# Patient Record
Sex: FEMALE | HISPANIC OR LATINO
[De-identification: names, ages, dates, MRNs, and addresses within clinical notes are randomized per-mention and may not be internally consistent; named-entity substitution may affect disease eponyms.]

---

## 2022-08-01 ENCOUNTER — TRANSCRIBE ORDERS (OUTPATIENT)
Dept: OTHER | Age: 42
End: 2022-08-01

## 2022-08-01 DIAGNOSIS — R74.8 ALKALINE PHOSPHATASE ELEVATION: Primary | ICD-10-CM

## 2022-08-18 NOTE — TELEPHONE ENCOUNTER
RECORDS RECEIVED FROM: Care Everywhere   Appt Date: 08.23.2022   NOTES STATUS DETAILS   OFFICE NOTE from referring provider     OFFICE NOTES from other specialists Care Everywhere 07.20.2022 Thea Abraham MD   [facility]    DISCHARGE SUMMARY from hospital     MEDICATION LIST Care Everywhere    LIVER BIOSPY (IF APPLICABLE)      PATHOLOGY REPORTS      IMAGING     ENDOSCOPY (IF AVAILABLE)     COLONOSCOPY (IF AVAILABLE)     ULTRASOUND LIVER     CT OF ABDOMEN     MRI OF LIVER     FIBROSCAN, US ELASTOGRAPHY, FIBROSIS SCAN, MR ELASTOGRAPHY     LABS     HEPATIC PANEL (LIVER PANEL) Internal 07.20.2022   BASIC METABOLIC PANEL Internal 06.01.2022   COMPLETE METABOLIC PANEL Internal 05.23.2022   COMPLETE BLOOD COUNT (CBC) Internal 05.23.2022   INTERNATIONAL NORMALIZED RATIO (INR)     HEPATITIS C ANTIBODY Care Everywhere 06.01.2022   HEPATITIS C VIRAL LOAD/PCR     HEPATITIS C GENOTYPE     HEPATITIS B SURFACE ANTIGEN Care Everywhere 06.01.2022   HEPATITIS B SURFACE ANTIBODY Care Everywhere 06.01.2022   HEPATITIS B DNA QUANT LEVEL     HEPATITIS B CORE ANTIBODY Care Everywhere 06.01.2022

## 2022-08-23 ENCOUNTER — PRE VISIT (OUTPATIENT)
Dept: GASTROENTEROLOGY | Facility: CLINIC | Age: 42
End: 2022-08-23

## 2022-08-23 ENCOUNTER — LAB (OUTPATIENT)
Dept: LAB | Facility: CLINIC | Age: 42
End: 2022-08-23

## 2022-08-23 ENCOUNTER — OFFICE VISIT (OUTPATIENT)
Dept: GASTROENTEROLOGY | Facility: CLINIC | Age: 42
End: 2022-08-23
Attending: STUDENT IN AN ORGANIZED HEALTH CARE EDUCATION/TRAINING PROGRAM
Payer: COMMERCIAL

## 2022-08-23 VITALS
TEMPERATURE: 98.3 F | OXYGEN SATURATION: 99 % | WEIGHT: 124.9 LBS | HEART RATE: 61 BPM | DIASTOLIC BLOOD PRESSURE: 57 MMHG | SYSTOLIC BLOOD PRESSURE: 97 MMHG

## 2022-08-23 DIAGNOSIS — R74.8 ALKALINE PHOSPHATASE ELEVATION: ICD-10-CM

## 2022-08-23 DIAGNOSIS — N28.9 KIDNEY LESION: ICD-10-CM

## 2022-08-23 DIAGNOSIS — R79.89 ELEVATED LFTS: Primary | ICD-10-CM

## 2022-08-23 LAB
ALBUMIN SERPL-MCNC: 3.7 G/DL (ref 3.4–5)
ALP SERPL-CCNC: 251 U/L (ref 40–150)
ALT SERPL W P-5'-P-CCNC: 48 U/L (ref 0–50)
ANION GAP SERPL CALCULATED.3IONS-SCNC: 5 MMOL/L (ref 3–14)
AST SERPL W P-5'-P-CCNC: 38 U/L (ref 0–45)
BILIRUB SERPL-MCNC: 0.6 MG/DL (ref 0.2–1.3)
BUN SERPL-MCNC: 17 MG/DL (ref 7–30)
CALCIUM SERPL-MCNC: 8.9 MG/DL (ref 8.5–10.1)
CHLORIDE BLD-SCNC: 110 MMOL/L (ref 94–109)
CO2 SERPL-SCNC: 24 MMOL/L (ref 20–32)
CREAT SERPL-MCNC: 0.64 MG/DL (ref 0.52–1.04)
GFR SERPL CREATININE-BSD FRML MDRD: >90 ML/MIN/1.73M2
GLUCOSE BLD-MCNC: 81 MG/DL (ref 70–99)
POTASSIUM BLD-SCNC: 3.7 MMOL/L (ref 3.4–5.3)
PROT SERPL-MCNC: 9.2 G/DL (ref 6.8–8.8)
SODIUM SERPL-SCNC: 139 MMOL/L (ref 133–144)

## 2022-08-23 PROCEDURE — 99204 OFFICE O/P NEW MOD 45 MIN: CPT | Performed by: STUDENT IN AN ORGANIZED HEALTH CARE EDUCATION/TRAINING PROGRAM

## 2022-08-23 PROCEDURE — 36415 COLL VENOUS BLD VENIPUNCTURE: CPT | Performed by: PATHOLOGY

## 2022-08-23 PROCEDURE — G0463 HOSPITAL OUTPT CLINIC VISIT: HCPCS

## 2022-08-23 PROCEDURE — 82784 ASSAY IGA/IGD/IGG/IGM EACH: CPT | Performed by: STUDENT IN AN ORGANIZED HEALTH CARE EDUCATION/TRAINING PROGRAM

## 2022-08-23 PROCEDURE — 86364 TISS TRNSGLTMNASE EA IG CLAS: CPT | Performed by: STUDENT IN AN ORGANIZED HEALTH CARE EDUCATION/TRAINING PROGRAM

## 2022-08-23 PROCEDURE — 99000 SPECIMEN HANDLING OFFICE-LAB: CPT | Performed by: PATHOLOGY

## 2022-08-23 PROCEDURE — 82103 ALPHA-1-ANTITRYPSIN TOTAL: CPT | Mod: 90 | Performed by: PATHOLOGY

## 2022-08-23 PROCEDURE — 80053 COMPREHEN METABOLIC PANEL: CPT | Performed by: PATHOLOGY

## 2022-08-23 PROCEDURE — 81332 SERPINA1 GENE: CPT | Mod: 90 | Performed by: PATHOLOGY

## 2022-08-23 RX ORDER — IBUPROFEN 200 MG
600 TABLET ORAL
COMMUNITY
Start: 2021-08-05

## 2022-08-23 ASSESSMENT — PAIN SCALES - GENERAL: PAINLEVEL: NO PAIN (0)

## 2022-08-23 NOTE — LETTER
Date:August 23, 2022      Patient was self referred, no letter generated. Do not send.        [facility] Health Information

## 2022-08-23 NOTE — LETTER
8/23/2022         RE: Sofia Schmitz  [address]  [address]  [place] [address]        Dear Colleague,    Thank you for referring your patient, Sofia Schmitz, to the [facility] [place]. Please see a copy of my visit note below.    [facility] New Patient Visit    Date of Visit: August 23, 2022    Reason for referral: elevated alkaline phosphatase    Subjective: Ms. Gi Schmitz is a 41 year old woman with no significant past medical history who presents for evaluation of elevated LFTs.     No previous known liver disease, imaging tests. No known history of liver decompensation. She was first told she had elevated LFTs a few months ago.     7/2022  ASMA negative  SNOW positive  AMA negative  AST 36 ALT 42 Alk phos 212    6/2022  Hepatitis A Total Ab reactive  Hepatitis B Sag, core ab negative   Hepatitis C Ab negative    5/2022   Alk phos 240    5/2021   Alk phos 202  AST 38  ALT 37    Rarely drinks alcohol. No family history of liver disease. No supplements.     She is asymptomatic. No abdominal pain. No nausea, vomiting.     ROS: 14 point ROS negative except for positives noted in HPI.    PMHx:  None    PSHx:  None    FamHx:  No family history of liver disease, liver cancer    SocHx:  Social History     Socioeconomic History     Marital status: Single     Spouse name: Not on file     Number of children: Not on file     Years of education: Not on file     Highest education level: Not on file   Occupational History     Not on file   Tobacco Use     Smoking status: Never Smoker     Smokeless tobacco: Never Used   Substance and Sexual Activity     Alcohol use: Not on file     Drug use: Not on file     Sexual activity: Not on file   Other Topics Concern     Not on file   Social History Narrative     Not on file     Social Determinants of Health     Financial Resource Strain: Not on file   Food Insecurity: Not on file    Transportation Needs: Not on file   Physical Activity: Not on file   Stress: Not on file   Social Connections: Not on file   Intimate Partner Violence: Not on file   Housing Stability: Not on file   Drinks very rarely, last 6 months ago    Medications:  Current Outpatient Medications   Medication     Fe fumarate-B12-vit C-FA-IFC (FEROCON) capsule     ibuprofen (ADVIL/MOTRIN) 200 MG tablet     No current facility-administered medications for this visit.     No OTCs, herbals    Allergies:  No Known Allergies    Objective:  BP 97/57   Pulse 61   Temp 98.3  F (36.8  C)   Wt 56.7 kg (124 lb 14.4 oz)   SpO2 99%   Constitutional: pleasant woman in NAD  Eyes: non icteric  Respiratory: Normal respiratory excursion   MSK: normal range of motion of visualized extremities  Abd: Non distended  Skin: No jaundice  Psychiatric: normal mood and orientation    Labs:  Reviewed in EHR    Imaging: None    Independently reviewed labs and imaging.     Assessment/Plan: Ms. Gi Schmitz is a 41 year old woman with no significant past medical history who presents for evaluation of elevated LFTs.     Hepatitis B and C testing was negative. SNOW was positive with negative ASMA and AMA. She does not drink alcohol, does not have clear risk factors for NAFLD. Will complete serologic work up for elevated LFTs and obtain a liver US. If her LFTs uptrend will need to consider a liver biopsy.     Orders Placed This Encounter   Procedures     US Abdomen Limited     Tissue transglutaminase doris IgA and IgG     Comprehensive metabolic panel (BMP + Alb, Alk Phos, ALT, AST, Total. Bili, TP)     Alpha 1 antitryp pao reflex to pheno     IgG     IgM     RTC 3 months.    Susi Mckeon MD MS  Hepatology/Liver Transplant  [facility]          Again, thank you for allowing me to participate in the care of your patient.        Sincerely,        Susi Mckeon MD

## 2022-08-23 NOTE — PROGRESS NOTES
[facility] Liver Clinic New Patient Visit    Date of Visit: August 23, 2022    Reason for referral: elevated alkaline phosphatase    Subjective: Ms. Gi Schmitz is a 41 year old woman with no significant past medical history who presents for evaluation of elevated LFTs.     No previous known liver disease, imaging tests. No known history of liver decompensation. She was first told she had elevated LFTs a few months ago.     7/2022  ASMA negative  SNOW positive  AMA negative  AST 36 ALT 42 Alk phos 212    6/2022  Hepatitis A Total Ab reactive  Hepatitis B Sag, core ab negative   Hepatitis C Ab negative    5/2022   Alk phos 240    5/2021   Alk phos 202  AST 38  ALT 37    Rarely drinks alcohol. No family history of liver disease. No supplements.     She is asymptomatic. No abdominal pain. No nausea, vomiting.     ROS: 14 point ROS negative except for positives noted in HPI.    PMHx:  None    PSHx:  None    FamHx:  No family history of liver disease, liver cancer    SocHx:  Social History     Socioeconomic History     Marital status: Single     Spouse name: Not on file     Number of children: Not on file     Years of education: Not on file     Highest education level: Not on file   Occupational History     Not on file   Tobacco Use     Smoking status: Never Smoker     Smokeless tobacco: Never Used   Substance and Sexual Activity     Alcohol use: Not on file     Drug use: Not on file     Sexual activity: Not on file   Other Topics Concern     Not on file   Social History Narrative     Not on file     Social Determinants of Health     Financial Resource Strain: Not on file   Food Insecurity: Not on file   Transportation Needs: Not on file   Physical Activity: Not on file   Stress: Not on file   Social Connections: Not on file   Intimate Partner Violence: Not on file   Housing Stability: Not on file   Drinks very rarely, last 6 months ago    Medications:  Current Outpatient Medications   Medication     Fe  fumarate-B12-vit C-FA-IFC (FEROCON) capsule     ibuprofen (ADVIL/MOTRIN) 200 MG tablet     No current facility-administered medications for this visit.     No OTCs, herbals    Allergies:  No Known Allergies    Objective:  BP 97/57   Pulse 61   Temp 98.3  F (36.8  C)   Wt 56.7 kg (124 lb 14.4 oz)   SpO2 99%   Constitutional: pleasant woman in NAD  Eyes: non icteric  Respiratory: Normal respiratory excursion   MSK: normal range of motion of visualized extremities  Abd: Non distended  Skin: No jaundice  Psychiatric: normal mood and orientation    Labs:  Reviewed in EHR    Imaging: None    Independently reviewed labs and imaging.     Assessment/Plan: Ms. Gi Schmitz is a 41 year old woman with no significant past medical history who presents for evaluation of elevated LFTs.     Hepatitis B and C testing was negative. SNOW was positive with negative ASMA and AMA. She does not drink alcohol, does not have clear risk factors for NAFLD. Will complete serologic work up for elevated LFTs and obtain a liver US. If her LFTs uptrend will need to consider a liver biopsy.     Orders Placed This Encounter   Procedures     US Abdomen Limited     Tissue transglutaminase doris IgA and IgG     Comprehensive metabolic panel (BMP + Alb, Alk Phos, ALT, AST, Total. Bili, TP)     Alpha 1 antitryp pao reflex to pheno     IgG     IgM     RTC 3 months.    Susi Mckeon MD MS  Hepatology/Liver Transplant  [facility]

## 2022-08-23 NOTE — NURSING NOTE
Chief Complaint   Patient presents with     Consult     New pt. Consult.     Blood pressure 97/57, pulse 61, temperature 98.3  F (36.8  C), weight 56.7 kg (124 lb 14.4 oz), SpO2 99 %.    DEJUAN ARAUJO

## 2022-08-24 LAB
IGG SERPL-MCNC: 2668 MG/DL (ref 610–1616)
IGM SERPL-MCNC: 412 MG/DL (ref 35–242)

## 2022-08-25 LAB
TTG IGA SER-ACNC: 0.5 U/ML
TTG IGG SER-ACNC: 0.7 U/ML

## 2022-08-27 LAB
A1AT PHENOTYP SERPL-IMP: NORMAL
A1AT SERPL-MCNC: 149 MG/DL
A1AT SS SERPL-MCNC: NEGATIVE G/L
A1AT SZ SERPL-MCNC: NORMAL G/L
A1AT ZZ SERPL-MCNC: NEGATIVE G/L
SPECIMEN SOURCE: NORMAL

## 2022-08-29 ENCOUNTER — TELEPHONE (OUTPATIENT)
Dept: GASTROENTEROLOGY | Facility: CLINIC | Age: 42
End: 2022-08-29

## 2022-08-29 DIAGNOSIS — R79.89 ELEVATED LFTS: Primary | ICD-10-CM

## 2022-08-29 NOTE — CONSULTS
Outpatient IR Biopsy Referral    Patient is a 42 y/o female with a PMH of iron deficiency anemia, hypothyroidism, adjustment disorder with mixed anxiety and depressed mood, cystocele, and abnormal pap-smears with newly noted elevated LFTs. IR has been asked for a random biopsy the liver for forward treatment.       8/31/22 scheduled. Not completed at time of referral.     Procedure order, surgical pathology orders placed.    If requesting team would like samples sent for anything else please enter them or notify IR prior to scheduled procedure.    Primary team Dr. Mckeon made aware of IR recommendations via epic messaging.     STIVEN Leblanc CNP  Interventional Radiology   IR on-call pager: [phone]

## 2022-08-29 NOTE — TELEPHONE ENCOUNTER
IR referral entered for random percutaneous liver bx. IR  will call pt for appointment once chart reviewed.    Kathy MCGEE LPN  Hepatology Clinic

## 2022-08-29 NOTE — TELEPHONE ENCOUNTER
----- Message from Susi Mckeon MD sent at 8/29/2022  1:01 PM CDT -----  I called her with an interpretor to recommend a liver bx, she is in agreement. Kathy can you place an order for a random percutaneous liver bx with IR?

## 2022-08-31 ENCOUNTER — TELEPHONE (OUTPATIENT)
Dept: GASTROENTEROLOGY | Facility: CLINIC | Age: 42
End: 2022-08-31

## 2022-08-31 ENCOUNTER — ANCILLARY PROCEDURE (OUTPATIENT)
Dept: ULTRASOUND IMAGING | Facility: CLINIC | Age: 42
End: 2022-08-31
Attending: STUDENT IN AN ORGANIZED HEALTH CARE EDUCATION/TRAINING PROGRAM
Payer: COMMERCIAL

## 2022-08-31 DIAGNOSIS — R74.8 ALKALINE PHOSPHATASE ELEVATION: ICD-10-CM

## 2022-08-31 LAB — RADIOLOGIST FLAGS: NORMAL

## 2022-08-31 PROCEDURE — 76705 ECHO EXAM OF ABDOMEN: CPT | Performed by: RADIOLOGY

## 2022-08-31 NOTE — TELEPHONE ENCOUNTER
9/8/22-Patient scheduled for MRI by clinic coordinator for 9/26/22.    Kathy MCGEE LPN  Hepatology Clinic      --------  Call back to Ivania with [facility]. Ivania reports imaging finding of possible right kidney mass. Message sent to Dr. Mckeon.    Plan is for patient to have follow-up MRI. Message sent to Hepatology  to call patient to schedule MRI.    Kathy MCGEE LPN  Hepatology Clinic

## 2022-08-31 NOTE — TELEPHONE ENCOUNTER
[facility] Call Center    Phone Message    May a detailed message be left on voicemail: yes     Reason for Call: Other: Ivania from [facility] was calling to report an incidental finding in the patient's recent scans for Dr.Mary Mckeon. Please call them back at [phone] to go over results, thank you!     Action Taken: Message routed to:  [facility] HEP    Travel Screening: Not Applicable

## 2022-09-06 ENCOUNTER — TELEPHONE (OUTPATIENT)
Dept: GASTROENTEROLOGY | Facility: CLINIC | Age: 42
End: 2022-09-06

## 2022-09-06 ENCOUNTER — ANESTHESIA EVENT (OUTPATIENT)
Dept: SURGERY | Facility: AMBULATORY SURGERY CENTER | Age: 42
End: 2022-09-06
Payer: COMMERCIAL

## 2022-09-07 ENCOUNTER — HOSPITAL ENCOUNTER (OUTPATIENT)
Facility: AMBULATORY SURGERY CENTER | Age: 42
Discharge: HOME | End: 2022-09-07
Attending: RADIOLOGY
Payer: COMMERCIAL

## 2022-09-07 ENCOUNTER — ANESTHESIA (OUTPATIENT)
Dept: SURGERY | Facility: AMBULATORY SURGERY CENTER | Age: 42
End: 2022-09-07
Payer: COMMERCIAL

## 2022-09-07 VITALS
BODY MASS INDEX: 23.98 KG/M2 | RESPIRATION RATE: 16 BRPM | OXYGEN SATURATION: 100 % | TEMPERATURE: 97 F | HEIGHT: 61 IN | WEIGHT: 127 LBS | HEART RATE: 62 BPM | DIASTOLIC BLOOD PRESSURE: 43 MMHG | SYSTOLIC BLOOD PRESSURE: 99 MMHG

## 2022-09-07 LAB
HCG UR QL: NEGATIVE
INTERNAL QC OK POCT: NORMAL
POCT KIT EXPIRATION DATE: NORMAL
POCT KIT LOT NUMBER: NORMAL

## 2022-09-07 PROCEDURE — 47000 NEEDLE BIOPSY OF LIVER PERQ: CPT | Performed by: RADIOLOGY

## 2022-09-07 PROCEDURE — 81025 URINE PREGNANCY TEST: CPT | Performed by: PATHOLOGY

## 2022-09-07 PROCEDURE — 88313 SPECIAL STAINS GROUP 2: CPT | Mod: TC | Performed by: RADIOLOGY

## 2022-09-07 RX ORDER — ACETAMINOPHEN 325 MG/1
975 TABLET ORAL ONCE
Status: COMPLETED | OUTPATIENT
Start: 2022-09-07 | End: 2022-09-07

## 2022-09-07 RX ORDER — PROPOFOL 10 MG/ML
INJECTION, EMULSION INTRAVENOUS PRN
Status: DISCONTINUED | OUTPATIENT
Start: 2022-09-07 | End: 2022-09-07

## 2022-09-07 RX ORDER — HYDROMORPHONE HYDROCHLORIDE 1 MG/ML
0.2 INJECTION, SOLUTION INTRAMUSCULAR; INTRAVENOUS; SUBCUTANEOUS EVERY 5 MIN PRN
Status: DISCONTINUED | OUTPATIENT
Start: 2022-09-07 | End: 2022-09-08 | Stop reason: HOSPADM

## 2022-09-07 RX ORDER — SODIUM CHLORIDE, SODIUM LACTATE, POTASSIUM CHLORIDE, CALCIUM CHLORIDE 600; 310; 30; 20 MG/100ML; MG/100ML; MG/100ML; MG/100ML
INJECTION, SOLUTION INTRAVENOUS CONTINUOUS
Status: DISCONTINUED | OUTPATIENT
Start: 2022-09-07 | End: 2022-09-08 | Stop reason: HOSPADM

## 2022-09-07 RX ORDER — LIDOCAINE HYDROCHLORIDE 20 MG/ML
INJECTION, SOLUTION INFILTRATION; PERINEURAL PRN
Status: DISCONTINUED | OUTPATIENT
Start: 2022-09-07 | End: 2022-09-07

## 2022-09-07 RX ORDER — LIDOCAINE 40 MG/G
CREAM TOPICAL
Status: DISCONTINUED | OUTPATIENT
Start: 2022-09-07 | End: 2022-09-08 | Stop reason: HOSPADM

## 2022-09-07 RX ORDER — ONDANSETRON 4 MG/1
4 TABLET, ORALLY DISINTEGRATING ORAL EVERY 30 MIN PRN
Status: DISCONTINUED | OUTPATIENT
Start: 2022-09-07 | End: 2022-09-08 | Stop reason: HOSPADM

## 2022-09-07 RX ORDER — PROPOFOL 10 MG/ML
INJECTION, EMULSION INTRAVENOUS CONTINUOUS PRN
Status: DISCONTINUED | OUTPATIENT
Start: 2022-09-07 | End: 2022-09-07

## 2022-09-07 RX ORDER — MEPERIDINE HYDROCHLORIDE 25 MG/ML
12.5 INJECTION INTRAMUSCULAR; INTRAVENOUS; SUBCUTANEOUS
Status: DISCONTINUED | OUTPATIENT
Start: 2022-09-07 | End: 2022-09-08 | Stop reason: HOSPADM

## 2022-09-07 RX ORDER — OXYCODONE HYDROCHLORIDE 5 MG/1
5 TABLET ORAL EVERY 4 HOURS PRN
Status: DISCONTINUED | OUTPATIENT
Start: 2022-09-07 | End: 2022-09-08 | Stop reason: HOSPADM

## 2022-09-07 RX ORDER — ONDANSETRON 2 MG/ML
4 INJECTION INTRAMUSCULAR; INTRAVENOUS EVERY 30 MIN PRN
Status: DISCONTINUED | OUTPATIENT
Start: 2022-09-07 | End: 2022-09-08 | Stop reason: HOSPADM

## 2022-09-07 RX ORDER — FENTANYL CITRATE 50 UG/ML
25 INJECTION, SOLUTION INTRAMUSCULAR; INTRAVENOUS
Status: DISCONTINUED | OUTPATIENT
Start: 2022-09-07 | End: 2022-09-08 | Stop reason: HOSPADM

## 2022-09-07 RX ORDER — LIDOCAINE HYDROCHLORIDE 10 MG/ML
INJECTION, SOLUTION EPIDURAL; INFILTRATION; INTRACAUDAL; PERINEURAL PRN
Status: DISCONTINUED | OUTPATIENT
Start: 2022-09-07 | End: 2022-09-07 | Stop reason: HOSPADM

## 2022-09-07 RX ORDER — FENTANYL CITRATE 50 UG/ML
25 INJECTION, SOLUTION INTRAMUSCULAR; INTRAVENOUS EVERY 5 MIN PRN
Status: DISCONTINUED | OUTPATIENT
Start: 2022-09-07 | End: 2022-09-08 | Stop reason: HOSPADM

## 2022-09-07 RX ADMIN — LIDOCAINE HYDROCHLORIDE 60 MG: 20 INJECTION, SOLUTION INFILTRATION; PERINEURAL at 08:46

## 2022-09-07 RX ADMIN — SODIUM CHLORIDE, SODIUM LACTATE, POTASSIUM CHLORIDE, CALCIUM CHLORIDE: 600; 310; 30; 20 INJECTION, SOLUTION INTRAVENOUS at 08:43

## 2022-09-07 RX ADMIN — ACETAMINOPHEN 975 MG: 325 TABLET ORAL at 08:29

## 2022-09-07 RX ADMIN — PROPOFOL 200 MCG/KG/MIN: 10 INJECTION, EMULSION INTRAVENOUS at 08:46

## 2022-09-07 RX ADMIN — PROPOFOL 50 MG: 10 INJECTION, EMULSION INTRAVENOUS at 08:50

## 2022-09-07 RX ADMIN — PROPOFOL 40 MG: 10 INJECTION, EMULSION INTRAVENOUS at 08:56

## 2022-09-07 NOTE — ANESTHESIA POSTPROCEDURE EVALUATION
Patient: Sofia Rodriguez    Procedure: Procedure(s):  NEEDLE BIOPSY, LIVER, PERCUTANEOUS       Anesthesia Type:  MAC    Note:  Disposition: Outpatient   Postop Pain Control: Uneventful            Sign Out: Well controlled pain   PONV: No   Neuro/Psych: Uneventful            Sign Out: Acceptable/Baseline neuro status   Airway/Respiratory: Uneventful            Sign Out: Acceptable/Baseline resp. status   CV/Hemodynamics: Uneventful            Sign Out: Acceptable CV status; No obvious hypovolemia; No obvious fluid overload   Other NRE: NONE   DID A NON-ROUTINE EVENT OCCUR? No           Last vitals:  Vitals Value Taken Time   BP 99/43 09/07/22 1043   Temp 36.1  C (97  F) 09/07/22 1043   Pulse 62 09/07/22 1043   Resp 16 09/07/22 1043   SpO2 100 % 09/07/22 1043       Electronically Signed By: Bill Bonner MD  September 7, 2022  11:17 AM

## 2022-09-07 NOTE — ANESTHESIA CARE TRANSFER NOTE
Patient: Sofia Rodriguez    Procedure: Procedure(s):  NEEDLE BIOPSY, LIVER, PERCUTANEOUS       Diagnosis: Elevated LFTs [R79.89]  Diagnosis Additional Information: No value filed.    Anesthesia Type:   MAC     Note:    Oropharynx: oropharynx clear of all foreign objects and spontaneously breathing  Level of Consciousness: drowsy  Oxygen Supplementation: room air    Independent Airway: airway patency satisfactory and stable  Dentition: dentition unchanged  Vital Signs Stable: post-procedure vital signs reviewed and stable  Report to RN Given: handoff report given  Patient transferred to: Phase II    Handoff Report: Identifed the Patient, Identified the Reponsible Provider, Reviewed the pertinent medical history, Discussed the surgical course, Reviewed Intra-OP anesthesia mangement and issues during anesthesia, Set expectations for post-procedure period and Allowed opportunity for questions and acknowledgement of understanding      Vitals:  Vitals Value Taken Time   BP 90/50 09/07/22 0911   Temp 36.1  C (97  F) 09/07/22 0911   Pulse 63 09/07/22 0911   Resp 18 09/07/22 0911   SpO2 98 % 09/07/22 0911       Electronically Signed By: STIVEN Gautam CRNA  September 7, 2022  9:12 AM

## 2022-09-07 NOTE — BRIEF OP NOTE
[facility]    Brief Operative Note    Pre-operative diagnosis: Elevated LFTs [R79.89]  Post-operative diagnosis Same as pre-operative diagnosis    Procedure: Procedure(s):  NEEDLE BIOPSY, LIVER, PERCUTANEOUS  Surgeon: Surgeon(s) and Role:     * Aman Streeter MD - Primary  Anesthesia: Monitor Anesthesia Care   Estimated Blood Loss: Minimal    Drains: None  Specimens:   ID Type Source Tests Collected by Time Destination   1 : Random Liver Biopsy x 3 cores Biopsy Liver SURGICAL PATHOLOGY EXAM Aman Streeter MD 9/7/2022  9:00 AM      Findings:   U/S guided liver biopsy anterior subcostal approach. 3 18g cores sent in formalin.  Gelfoam plug embolization of biopsy tract.  No post biopsy bleeding seen. .  Complications: None.  Implants: * No implants in log *

## 2022-09-07 NOTE — DISCHARGE INSTRUCTIONS
Discharge Instructions for Liver Biopsy  You had a procedure called liver biopsy. A healthcare provider used a special needle to remove a small piece of tissue from your liver.  A liver biopsy is ordered after other tests have shown that your liver is not working properly. You may also have a liver biopsy when liver disease is suspected.  Home care  Recommendations include the following:   If you had anesthesia, you should not drive until the day after your biopsy.   Remove the bandage covering the biopsy site 48 hours after the procedure.  Bedrest for 4 hours immediately after the procedure.  Don t shower for 48 hours after the biopsy. If you wish, you may wash yourself with a sponge or washcloth. When you are able to shower, don t scrub the site. Gently wash the area and pat it dry.  Don t lift anything heavier than 10 pounds for 3 days after the procedure, or as advised by your healthcare provider.  Don't do strenuous activities or exercises after the procedure.  Ask your healthcare provider when you can return to work.  Do not start taking blood thinners without clear instructions from your healthcare provider.  Follow-up care  Make a follow-up appointment as directed by our staff.     When to call your healthcare provider  Call your healthcare provider immediately if you have any of the following:  Bleeding from the biopsy site  Dizziness or lightheadedness  Sudden or increased shortness of breath  Sudden chest pain  Fever of 100.4 F (38.0 C) or higher, or as directed by your healthcare provider  Shaking chills  Increasing redness, tenderness, or swelling at the biopsy site  Drainage from the biopsy site  Opening of the biopsy site  Increasing pain, with or without activity, in the liver or belly area, or pain shooting to the right shoulder     Additional Instructions:    Please call our IR service for the following problems:       If the skin around the biopsy sight is swollen, reddened, painful, or has any  "discharge.  If you have persistent pain in biopsy sight.  If you have a fever of greater than 100.5  F and chills.  If you feel nauseated and \"just not right.\"      [facility]  Interventional Radiology (IR)  [address]  [address]nd Floor, [address] [facility] [place] [address]    Contact Number:  [phone]  (IR control desk)  Monday - Friday 8:00 am - 4:30 pm    After hours for urgent concerns:  [phone]  After 4:30 pm Monday - Friday, Weekends and Holidays.   Ask for Interventional Radiology on-call.  Someone is available 24 hours a day.  [facility] toll free number:  [phone]              "

## 2022-09-07 NOTE — ANESTHESIA PREPROCEDURE EVALUATION
Anesthesia Pre-Procedure Evaluation    Patient: Sofia Rodriguez   MRN: [id number] : 1980        Procedure : Procedure(s):  NEEDLE BIOPSY, LIVER, PERCUTANEOUS          No past medical history on file.   No past surgical history on file.   No Known Allergies   Social History     Tobacco Use     Smoking status: Never Smoker     Smokeless tobacco: Never Used   Substance Use Topics     Alcohol use: Not on file      Wt Readings from Last 1 Encounters:   22 57.6 kg (127 lb)        Anesthesia Evaluation   Pt has not had prior anesthetic         ROS/MED HX  ENT/Pulmonary:  - neg pulmonary ROS     Neurologic:  - neg neurologic ROS     Cardiovascular:  - neg cardiovascular ROS     METS/Exercise Tolerance:     Hematologic:  - neg hematologic  ROS     Musculoskeletal:  - neg musculoskeletal ROS     GI/Hepatic: Comment: Elevated alkaline phosphatase    (+) liver disease,  (-) GERD   Renal/Genitourinary:       Endo:  - neg endo ROS     Psychiatric/Substance Use:  - neg psychiatric ROS     Infectious Disease:  - neg infectious disease ROS     Malignancy:  - neg malignancy ROS     Other:  - neg other ROS          Physical Exam    Airway        Mallampati: II   TM distance: > 3 FB   Neck ROM: full   Mouth opening: > 3 cm    Respiratory Devices and Support         Dental  no notable dental history         Cardiovascular             Pulmonary                   OUTSIDE LABS:  CBC: No results found for: WBC, HGB, HCT, PLT  BMP:   Lab Results   Component Value Date     2022    POTASSIUM 3.7 2022    CHLORIDE 110 (H) 2022    CO2 24 2022    BUN 17 2022    CR 0.64 2022    GLC 81 2022     COAGS: No results found for: PTT, INR, FIBR  POC:   Lab Results   Component Value Date    HCG Negative 2022     HEPATIC:   Lab Results   Component Value Date    ALBUMIN 3.7 2022    PROTTOTAL 9.2 (H) 2022    ALT 48 2022    AST 38 2022    ALKPHOS 251 (H)  08/23/2022    BILITOTAL 0.6 08/23/2022     OTHER:   Lab Results   Component Value Date    STEVE 8.9 08/23/2022       Anesthesia Plan    ASA Status:  2   NPO Status:  NPO Appropriate    Anesthesia Type: MAC.     - Reason for MAC: straight local not clinically adequate   Induction: Intravenous, Propofol.   Maintenance: TIVA.        Consents    Anesthesia Plan(s) and associated risks, benefits, and realistic alternatives discussed. Questions answered and patient/representative(s) expressed understanding.    - Discussed:     - Discussed with:  Patient      - Extended Intubation/Ventilatory Support Discussed: No.      - Patient is DNR/DNI Status: No    Use of blood products discussed: No .     Postoperative Care    Pain management: IV analgesics.   PONV prophylaxis: Background Propofol Infusion     Comments:           H&P reviewed: Unable to attach H&P to encounter due to EHR limitations. H&P Update: appropriate H&P reviewed, patient examined. No interval changes since H&P (within 30 days).         Bill Bonner MD

## 2022-09-07 NOTE — LETTER
[address]  [address]LO[address]  [facility] [address]  Phone: [phone]  Fax: [phone]    September 7, 2022        Sofia Rodriguez  [address]    [place] [address]          To whom it may concern:    RE: Sofia Rodriguez    Patient was seen and treated today at our clinic and missed work.    Please contact me for questions or concerns.      Sincerely,      Aman Streeter MD  Interventional Radiology and Vascular Imaging Attending  Department of Radiology  [facility]

## 2022-09-12 LAB
PATH REPORT.COMMENTS IMP SPEC: NORMAL
PATH REPORT.FINAL DX SPEC: NORMAL
PATH REPORT.GROSS SPEC: NORMAL
PATH REPORT.MICROSCOPIC SPEC OTHER STN: NORMAL
PATH REPORT.RELEVANT HX SPEC: NORMAL
PHOTO IMAGE: NORMAL

## 2022-09-12 PROCEDURE — 88313 SPECIAL STAINS GROUP 2: CPT | Mod: 26 | Performed by: PATHOLOGY

## 2022-09-12 PROCEDURE — 88307 TISSUE EXAM BY PATHOLOGIST: CPT | Mod: 26 | Performed by: PATHOLOGY

## 2022-09-13 DIAGNOSIS — K74.3 PRIMARY BILIARY CHOLANGITIS (H): Primary | ICD-10-CM

## 2022-09-13 RX ORDER — URSODIOL 300 MG/1
900 CAPSULE ORAL DAILY
Qty: 270 CAPSULE | Refills: 3 | Status: SHIPPED | OUTPATIENT
Start: 2022-09-13 | End: 2023-09-13

## 2022-09-26 ENCOUNTER — HOSPITAL ENCOUNTER (OUTPATIENT)
Dept: MRI IMAGING | Facility: CLINIC | Age: 42
Discharge: HOME | End: 2022-09-26
Attending: STUDENT IN AN ORGANIZED HEALTH CARE EDUCATION/TRAINING PROGRAM | Admitting: STUDENT IN AN ORGANIZED HEALTH CARE EDUCATION/TRAINING PROGRAM
Payer: COMMERCIAL

## 2022-09-26 DIAGNOSIS — N28.9 KIDNEY LESION: ICD-10-CM

## 2022-09-26 PROCEDURE — A9585 GADOBUTROL INJECTION: HCPCS | Performed by: STUDENT IN AN ORGANIZED HEALTH CARE EDUCATION/TRAINING PROGRAM

## 2022-09-26 PROCEDURE — 74183 MRI ABD W/O CNTR FLWD CNTR: CPT | Mod: 26 | Performed by: RADIOLOGY

## 2022-09-26 PROCEDURE — 74183 MRI ABD W/O CNTR FLWD CNTR: CPT

## 2022-09-26 PROCEDURE — 255N000002 HC RX 255 OP 636: Performed by: STUDENT IN AN ORGANIZED HEALTH CARE EDUCATION/TRAINING PROGRAM

## 2022-09-26 RX ORDER — GADOBUTROL 604.72 MG/ML
5.5 INJECTION INTRAVENOUS ONCE
Status: COMPLETED | OUTPATIENT
Start: 2022-09-26 | End: 2022-09-26

## 2022-09-26 RX ADMIN — GADOBUTROL 5.5 ML: 604.72 INJECTION INTRAVENOUS at 07:57

## 2022-11-15 ENCOUNTER — LAB (OUTPATIENT)
Dept: LAB | Facility: CLINIC | Age: 42
End: 2022-11-15
Payer: COMMERCIAL

## 2022-11-15 ENCOUNTER — OFFICE VISIT (OUTPATIENT)
Dept: GASTROENTEROLOGY | Facility: CLINIC | Age: 42
End: 2022-11-15
Attending: STUDENT IN AN ORGANIZED HEALTH CARE EDUCATION/TRAINING PROGRAM
Payer: COMMERCIAL

## 2022-11-15 VITALS
HEIGHT: 61 IN | DIASTOLIC BLOOD PRESSURE: 60 MMHG | BODY MASS INDEX: 24.32 KG/M2 | OXYGEN SATURATION: 99 % | HEART RATE: 68 BPM | SYSTOLIC BLOOD PRESSURE: 94 MMHG | WEIGHT: 128.8 LBS | TEMPERATURE: 97.6 F

## 2022-11-15 DIAGNOSIS — K74.3 PRIMARY BILIARY CHOLANGITIS (H): ICD-10-CM

## 2022-11-15 DIAGNOSIS — K74.3 PRIMARY BILIARY CHOLANGITIS (H): Primary | ICD-10-CM

## 2022-11-15 LAB
ALBUMIN SERPL BCG-MCNC: 4.1 G/DL (ref 3.5–5.2)
ALP SERPL-CCNC: 173 U/L (ref 35–104)
ALT SERPL W P-5'-P-CCNC: 28 U/L (ref 10–35)
AST SERPL W P-5'-P-CCNC: 29 U/L (ref 10–35)
BILIRUB DIRECT SERPL-MCNC: <0.2 MG/DL (ref 0–0.3)
BILIRUB SERPL-MCNC: 0.6 MG/DL
PROT SERPL-MCNC: 8.5 G/DL (ref 6.4–8.3)

## 2022-11-15 PROCEDURE — 36415 COLL VENOUS BLD VENIPUNCTURE: CPT | Performed by: PATHOLOGY

## 2022-11-15 PROCEDURE — 80076 HEPATIC FUNCTION PANEL: CPT | Performed by: PATHOLOGY

## 2022-11-15 PROCEDURE — G0463 HOSPITAL OUTPT CLINIC VISIT: HCPCS

## 2022-11-15 PROCEDURE — 99214 OFFICE O/P EST MOD 30 MIN: CPT | Performed by: STUDENT IN AN ORGANIZED HEALTH CARE EDUCATION/TRAINING PROGRAM

## 2022-11-15 RX ORDER — URSODIOL 300 MG/1
300 CAPSULE ORAL 3 TIMES DAILY
Qty: 270 CAPSULE | Refills: 3 | Status: SHIPPED | OUTPATIENT
Start: 2022-11-15 | End: 2023-11-15

## 2022-11-15 ASSESSMENT — PAIN SCALES - GENERAL: PAINLEVEL: SEVERE PAIN (7)

## 2022-11-15 NOTE — LETTER
Date:November 16, 2022      Patient was self referred, no letter generated. Do not send.        [facility] Health Information

## 2022-11-15 NOTE — LETTER
11/15/2022         RE: Sofia Rodriguez  [address] [address]        Dear Colleague,    Thank you for referring your patient, Sofia Rodriguez, to the [facility]PATOLOG[facility] [place]. Please see a copy of my visit note below.    [facility] Return Patient Visit    Date of Visit: 11/15/2022    Reason for referral: elevated alkaline phosphatase    Subjective: Ms. 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     Initial History:     No previous known liver disease, imaging tests. No known history of liver decompensation. She was first told she had elevated LFTs a few months ago.     7/2022  ASMA negative  SNOW positive  AMA negative  AST 36 ALT 42 Alk phos 212    6/2022  Hepatitis A Total Ab reactive  Hepatitis B Sag, core ab negative   Hepatitis C Ab negative    5/2022   Alk phos 240    5/2021   Alk phos 202  AST 38  ALT 37  TSH normal    Rarely drinks alcohol. No family history of liver disease. No supplements.     She is asymptomatic. No abdominal pain. No nausea, vomiting.     Interval Events:   - Liver Bx 9/7/2022 RANDOM LIVER BIOPSY X 3 CORES:  - Portal based inflammation with focal florid duct lesions suggestive for primary biliary cholangitis  - Started on ursodiol, having stomach pains with this, better if she takes its TID rather than all at once    ROS: 14 point ROS negative except for positives noted in HPI.    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    PSHx:  Liver bx    FamHx:  No family history of liver disease, liver cancer    SocHx:  Social History     Socioeconomic History     Marital status: Single     Spouse name: Not on file     Number of children: Not on file     Years of education: Not on file     Highest education level: Not on file   Occupational History     Not on file   Tobacco Use     Smoking status: Never      "Smokeless tobacco: Never   Substance and Sexual Activity     Alcohol use: Not on file     Drug use: Not on file     Sexual activity: Not on file   Other Topics Concern     Not on file   Social History Narrative     Not on file     Social Determinants of Health     Financial Resource Strain: Not on file   Food Insecurity: Not on file   Transportation Needs: Not on file   Physical Activity: Not on file   Stress: Not on file   Social Connections: Not on file   Intimate Partner Violence: Not on file   Housing Stability: Not on file   Drinks very rarely, last 6 months ago    Medications:  Current Outpatient Medications   Medication     Fe fumarate-B12-vit C-FA-IFC (FEROCON) capsule     ibuprofen (ADVIL/MOTRIN) 200 MG tablet     ursodiol (ACTIGALL) 300 MG capsule     ursodiol (ACTIGALL) 300 MG capsule     No current facility-administered medications for this visit.     No OTCs, herbals    Allergies:  No Known Allergies    Objective:  BP 94/60   Pulse 68   Temp 97.6  F (36.4  C) (Oral)   Ht 1.56 m (5' 1.42\")   Wt 58.4 kg (128 lb 12.8 oz)   SpO2 99%   BMI 24.01 kg/m    Constitutional: pleasant woman in NAD  Eyes: non icteric  Respiratory: Normal respiratory excursion   MSK: normal range of motion of visualized extremities  Abd: Non distended  Skin: No jaundice  Psychiatric: normal mood and orientation    Labs:  Reviewed in EHR    Imaging:     RUQ US 8/31/2022    FINDINGS:   Fluid: No evidence of ascites or pleural effusions.     Liver: The liver demonstrates normal echotexture, measuring 14.1 cm in  craniocaudal dimension. There is no focal mass.      Gallbladder: There is no wall thickening, pericholecystic fluid,  positive sonographic Harper's sign or evidence for cholelithiasis.     Bile Ducts: Both the intra- and extrahepatic biliary system are of  normal caliber.  The common bile duct measures 2 mm in diameter.     Pancreas: Visualized portions of the head and body of the pancreas are  unremarkable.      Kidney: " The right kidney measures 12.7 cm long. There is a 1.9 x 1.4 x  1.3 cm ill-defined hypoechoic area in the mid to upper right kidney  adjacent to the liver.                                                                      IMPRESSION:   1.  Normal appearance of the liver.  2.  1.9 cm hypoechoic region in the right kidney may represent solid  mass. Recommend dedicated renal protocol MRI or CT for further  Characterization.     - Follow up MRI kidney without any renal mass    Independently reviewed labs and imaging.     Assessment/Plan: Ms. Gi Schmitz is a 42 year old woman with no significant past medical history who presents for evaluation of elevated LFTs, ultimately found to be AMA negative PBC. IgM was markedly elevated. Started on ursodiol 9/2022.     Discussed the natural history of primary biliary cholangitis.  She does not have any fibrosis on her liver biopsy.  Ursodiol is first-line therapy to prevent progression of her liver disease.  She is agreeable to continuing this even though she is having some stomach discomfort with this.  Can take 3 times a day to help with this.  We will recheck her alkaline phosphatase today.  If her alkaline phosphatase is not improved after 1 year of treatment, would consider escalating therapy.    Discussed sister and daughters should be screened for PBC with LFTs in their 20-30s as they are higher risk for this.     She should get annual thyroid screening, was normal May 2022    Orders Placed This Encounter   Procedures     Hepatic panel (Albumin, ALT, AST, Bili, Alk Phos, TP)     RTC 9 months.    Susi Mckeon MD MS  Hepatology/Liver Transplant  [facility]    Approximately 25 minutes was spent for the visit with 10 minutes of non face-to-face time were spent in review of the patient's medical record on the day of the visit. This included review of previous: clinic visits, hospital records, lab results, imaging studies, and documentation.  The findings  from this review are summarized in the above note.            Again, thank you for allowing me to participate in the care of your patient.        Sincerely,        Susi Mckeon MD

## 2022-11-15 NOTE — NURSING NOTE
"Chief Complaint   Patient presents with     RECHECK     Follow-up for alk phos elevation     BP 94/60   Pulse 68   Temp 97.6  F (36.4  C) (Oral)   Ht 1.56 m (5' 1.42\")   Wt 58.4 kg (128 lb 12.8 oz)   SpO2 99%   BMI 24.01 kg/m      Apple Dc on 11/15/2022 at 9:22 AM    "

## 2022-11-15 NOTE — PROGRESS NOTES
[facility] Liver Clinic Return Patient Visit    Date of Visit: 11/15/2022    Reason for referral: elevated alkaline phosphatase    Subjective: Ms. 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     Initial History:     No previous known liver disease, imaging tests. No known history of liver decompensation. She was first told she had elevated LFTs a few months ago.     7/2022  ASMA negative  SNOW positive  AMA negative  AST 36 ALT 42 Alk phos 212    6/2022  Hepatitis A Total Ab reactive  Hepatitis B Sag, core ab negative   Hepatitis C Ab negative    5/2022   Alk phos 240    5/2021   Alk phos 202  AST 38  ALT 37  TSH normal    Rarely drinks alcohol. No family history of liver disease. No supplements.     She is asymptomatic. No abdominal pain. No nausea, vomiting.     Interval Events:   - Liver Bx 9/7/2022 RANDOM LIVER BIOPSY X 3 CORES:  - Portal based inflammation with focal florid duct lesions suggestive for primary biliary cholangitis  - Started on ursodiol, having stomach pains with this, better if she takes its TID rather than all at once    ROS: 14 point ROS negative except for positives noted in HPI.    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    PSHx:  Liver bx    FamHx:  No family history of liver disease, liver cancer    SocHx:  Social History     Socioeconomic History     Marital status: Single     Spouse name: Not on file     Number of children: Not on file     Years of education: Not on file     Highest education level: Not on file   Occupational History     Not on file   Tobacco Use     Smoking status: Never     Smokeless tobacco: Never   Substance and Sexual Activity     Alcohol use: Not on file     Drug use: Not on file     Sexual activity: Not on file   Other Topics Concern     Not on file   Social History Narrative     Not on file     Social Determinants of Health     Financial Resource  "Strain: Not on file   Food Insecurity: Not on file   Transportation Needs: Not on file   Physical Activity: Not on file   Stress: Not on file   Social Connections: Not on file   Intimate Partner Violence: Not on file   Housing Stability: Not on file   Drinks very rarely, last 6 months ago    Medications:  Current Outpatient Medications   Medication     Fe fumarate-B12-vit C-FA-IFC (FEROCON) capsule     ibuprofen (ADVIL/MOTRIN) 200 MG tablet     ursodiol (ACTIGALL) 300 MG capsule     ursodiol (ACTIGALL) 300 MG capsule     No current facility-administered medications for this visit.     No OTCs, herbals    Allergies:  No Known Allergies    Objective:  BP 94/60   Pulse 68   Temp 97.6  F (36.4  C) (Oral)   Ht 1.56 m (5' 1.42\")   Wt 58.4 kg (128 lb 12.8 oz)   SpO2 99%   BMI 24.01 kg/m    Constitutional: pleasant woman in NAD  Eyes: non icteric  Respiratory: Normal respiratory excursion   MSK: normal range of motion of visualized extremities  Abd: Non distended  Skin: No jaundice  Psychiatric: normal mood and orientation    Labs:  Reviewed in EHR    Imaging:     RUQ US 8/31/2022    FINDINGS:   Fluid: No evidence of ascites or pleural effusions.     Liver: The liver demonstrates normal echotexture, measuring 14.1 cm in  craniocaudal dimension. There is no focal mass.      Gallbladder: There is no wall thickening, pericholecystic fluid,  positive sonographic Harper's sign or evidence for cholelithiasis.     Bile Ducts: Both the intra- and extrahepatic biliary system are of  normal caliber.  The common bile duct measures 2 mm in diameter.     Pancreas: Visualized portions of the head and body of the pancreas are  unremarkable.      Kidney: The right kidney measures 12.7 cm long. There is a 1.9 x 1.4 x  1.3 cm ill-defined hypoechoic area in the mid to upper right kidney  adjacent to the liver.                                                                      IMPRESSION:   1.  Normal appearance of the liver.  2.  1.9 " cm hypoechoic region in the right kidney may represent solid  mass. Recommend dedicated renal protocol MRI or CT for further  Characterization.     - Follow up MRI kidney without any renal mass    Independently reviewed labs and imaging.     Assessment/Plan: Ms. Gi Schmitz is a 42 year old woman with no significant past medical history who presents for evaluation of elevated LFTs, ultimately found to be AMA negative PBC. IgM was markedly elevated. Started on ursodiol 9/2022.     Discussed the natural history of primary biliary cholangitis.  She does not have any fibrosis on her liver biopsy.  Ursodiol is first-line therapy to prevent progression of her liver disease.  She is agreeable to continuing this even though she is having some stomach discomfort with this.  Can take 3 times a day to help with this.  We will recheck her alkaline phosphatase today.  If her alkaline phosphatase is not improved after 1 year of treatment, would consider escalating therapy.    Discussed sister and daughters should be screened for PBC with LFTs in their 20-30s as they are higher risk for this.     She should get annual thyroid screening, was normal May 2022    Orders Placed This Encounter   Procedures     Hepatic panel (Albumin, ALT, AST, Bili, Alk Phos, TP)     RTC 9 months.    Susi Mckeon MD MS  Hepatology/Liver Transplant  [facility]    Approximately 25 minutes was spent for the visit with 10 minutes of non face-to-face time were spent in review of the patient's medical record on the day of the visit. This included review of previous: clinic visits, hospital records, lab results, imaging studies, and documentation.  The findings from this review are summarized in the above note.

## 2023-07-31 DIAGNOSIS — K74.3 PRIMARY BILIARY CHOLANGITIS (H): Primary | ICD-10-CM

## 2023-08-15 ENCOUNTER — OFFICE VISIT (OUTPATIENT)
Dept: GASTROENTEROLOGY | Facility: CLINIC | Age: 43
End: 2023-08-15
Attending: STUDENT IN AN ORGANIZED HEALTH CARE EDUCATION/TRAINING PROGRAM
Payer: COMMERCIAL

## 2023-08-15 ENCOUNTER — LAB (OUTPATIENT)
Dept: LAB | Facility: CLINIC | Age: 43
End: 2023-08-15
Payer: COMMERCIAL

## 2023-08-15 VITALS
DIASTOLIC BLOOD PRESSURE: 54 MMHG | BODY MASS INDEX: 23.3 KG/M2 | OXYGEN SATURATION: 97 % | WEIGHT: 125 LBS | SYSTOLIC BLOOD PRESSURE: 98 MMHG | HEART RATE: 71 BPM

## 2023-08-15 DIAGNOSIS — K74.3 PRIMARY BILIARY CHOLANGITIS (H): ICD-10-CM

## 2023-08-15 DIAGNOSIS — K74.3 PRIMARY BILIARY CHOLANGITIS (H): Primary | ICD-10-CM

## 2023-08-15 DIAGNOSIS — R74.8 ALKALINE PHOSPHATASE ELEVATION: ICD-10-CM

## 2023-08-15 LAB
ALBUMIN SERPL BCG-MCNC: 4.3 G/DL (ref 3.5–5.2)
ALP SERPL-CCNC: 131 U/L (ref 35–104)
ALT SERPL W P-5'-P-CCNC: 23 U/L (ref 0–50)
ANION GAP SERPL CALCULATED.3IONS-SCNC: 10 MMOL/L (ref 7–15)
AST SERPL W P-5'-P-CCNC: 28 U/L (ref 0–45)
BILIRUB DIRECT SERPL-MCNC: <0.2 MG/DL (ref 0–0.3)
BILIRUB SERPL-MCNC: 0.4 MG/DL
BUN SERPL-MCNC: 13.5 MG/DL (ref 6–20)
CALCIUM SERPL-MCNC: 9.5 MG/DL (ref 8.6–10)
CHLORIDE SERPL-SCNC: 107 MMOL/L (ref 98–107)
CREAT SERPL-MCNC: 0.67 MG/DL (ref 0.51–0.95)
DEPRECATED HCO3 PLAS-SCNC: 20 MMOL/L (ref 22–29)
ERYTHROCYTE [DISTWIDTH] IN BLOOD BY AUTOMATED COUNT: 15.7 % (ref 10–15)
GFR SERPL CREATININE-BSD FRML MDRD: >90 ML/MIN/1.73M2
GLUCOSE SERPL-MCNC: 97 MG/DL (ref 70–99)
HCT VFR BLD AUTO: 34.8 % (ref 35–47)
HGB BLD-MCNC: 11 G/DL (ref 11.7–15.7)
INR PPP: 1.15 (ref 0.85–1.15)
MCH RBC QN AUTO: 25.5 PG (ref 26.5–33)
MCHC RBC AUTO-ENTMCNC: 31.6 G/DL (ref 31.5–36.5)
MCV RBC AUTO: 81 FL (ref 78–100)
PLATELET # BLD AUTO: 321 10E3/UL (ref 150–450)
POTASSIUM SERPL-SCNC: 3.3 MMOL/L (ref 3.4–5.3)
PROT SERPL-MCNC: 8.4 G/DL (ref 6.4–8.3)
RBC # BLD AUTO: 4.31 10E6/UL (ref 3.8–5.2)
SODIUM SERPL-SCNC: 137 MMOL/L (ref 136–145)
TSH SERPL DL<=0.005 MIU/L-ACNC: 3.6 UIU/ML (ref 0.3–4.2)
WBC # BLD AUTO: 3.9 10E3/UL (ref 4–11)

## 2023-08-15 PROCEDURE — 84443 ASSAY THYROID STIM HORMONE: CPT | Performed by: PATHOLOGY

## 2023-08-15 PROCEDURE — 36415 COLL VENOUS BLD VENIPUNCTURE: CPT | Performed by: PATHOLOGY

## 2023-08-15 PROCEDURE — G0463 HOSPITAL OUTPT CLINIC VISIT: HCPCS | Performed by: STUDENT IN AN ORGANIZED HEALTH CARE EDUCATION/TRAINING PROGRAM

## 2023-08-15 PROCEDURE — 99214 OFFICE O/P EST MOD 30 MIN: CPT | Performed by: STUDENT IN AN ORGANIZED HEALTH CARE EDUCATION/TRAINING PROGRAM

## 2023-08-15 PROCEDURE — 82248 BILIRUBIN DIRECT: CPT | Performed by: PATHOLOGY

## 2023-08-15 PROCEDURE — 85027 COMPLETE CBC AUTOMATED: CPT | Performed by: PATHOLOGY

## 2023-08-15 PROCEDURE — 85610 PROTHROMBIN TIME: CPT | Performed by: PATHOLOGY

## 2023-08-15 PROCEDURE — 80053 COMPREHEN METABOLIC PANEL: CPT | Performed by: PATHOLOGY

## 2023-08-15 RX ORDER — URSODIOL 300 MG/1
300 CAPSULE ORAL 3 TIMES DAILY
Qty: 270 CAPSULE | Refills: 3 | Status: SHIPPED | OUTPATIENT
Start: 2023-08-15 | End: 2024-08-09

## 2023-08-15 NOTE — PROGRESS NOTES
[facility] Liver Clinic Return Patient Visit    Date of Visit: 8/15/2023    Reason for referral: elevated alkaline phosphatase    Subjective: Ms. 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     Initial History:     No previous known liver disease, imaging tests. No known history of liver decompensation. She was first told she had elevated LFTs a few months ago.     7/2022  ASMA negative  SNOW positive  AMA negative  AST 36 ALT 42 Alk phos 212    6/2022  Hepatitis A Total Ab reactive  Hepatitis B Sag, core ab negative   Hepatitis C Ab negative    5/2022   Alk phos 240    5/2021   Alk phos 202  AST 38  ALT 37  TSH normal    Rarely drinks alcohol. No family history of liver disease. No supplements.     She is asymptomatic. No abdominal pain. No nausea, vomiting.     Interval Events:   - Liver Bx 9/7/2022 RANDOM LIVER BIOPSY X 3 CORES:  - Portal based inflammation with focal florid duct lesions suggestive for primary biliary cholangitis without fibrosis.  - Started on ursodiol, having stomach pains with this, better if she takes its TID rather than all at once  - Alkaline phosphatase improved, almost normal  - Taking the ursodiol, sometimes will have stomach issues but ok with food    ROS: 14 point ROS negative except for positives noted in HPI.    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    PSHx:  Liver bx    FamHx:  No family history of liver disease, liver cancer    SocHx:  Social History     Socioeconomic History    Marital status: Single     Spouse name: Not on file    Number of children: Not on file    Years of education: Not on file    Highest education level: Not on file   Occupational History    Not on file   Tobacco Use    Smoking status: Never    Smokeless tobacco: Never   Substance and Sexual Activity    Alcohol use: Not on file    Drug use: Not on file    Sexual activity: Not on file   Other  Topics Concern    Not on file   Social History Narrative    Not on file     Social Determinants of Health     Financial Resource Strain: Not on file   Food Insecurity: Not on file   Transportation Needs: Not on file   Physical Activity: Not on file   Stress: Not on file   Social Connections: Not on file   Intimate Partner Violence: Not on file   Housing Stability: Not on file   Drinks very rarely, last 6 months ago    Medications:  Current Outpatient Medications   Medication    Fe fumarate-B12-vit C-FA-IFC (FEROCON) capsule    ibuprofen (ADVIL/MOTRIN) 200 MG tablet    ursodiol (ACTIGALL) 300 MG capsule    ursodiol (ACTIGALL) 300 MG capsule    ursodiol (ACTIGALL) 300 MG capsule     No current facility-administered medications for this visit.     No OTCs, herbals    Allergies:  No Known Allergies    Objective:  BP 98/54   Pulse 71   Wt 56.7 kg (125 lb)   SpO2 97%   BMI 23.30 kg/m    Constitutional: pleasant woman in NAD  Eyes: non icteric  Respiratory: Normal respiratory excursion   MSK: normal range of motion of visualized extremities  Abd: Non distended  Skin: No jaundice  Psychiatric: normal mood and orientation    Labs:  Reviewed in EHR    Imaging:     RUQ US 8/31/2022    FINDINGS:   Fluid: No evidence of ascites or pleural effusions.     Liver: The liver demonstrates normal echotexture, measuring 14.1 cm in  craniocaudal dimension. There is no focal mass.      Gallbladder: There is no wall thickening, pericholecystic fluid,  positive sonographic Harper's sign or evidence for cholelithiasis.     Bile Ducts: Both the intra- and extrahepatic biliary system are of  normal caliber.  The common bile duct measures 2 mm in diameter.     Pancreas: Visualized portions of the head and body of the pancreas are  unremarkable.      Kidney: The right kidney measures 12.7 cm long. There is a 1.9 x 1.4 x  1.3 cm ill-defined hypoechoic area in the mid to upper right kidney  adjacent to the liver.                                                                       IMPRESSION:   1.  Normal appearance of the liver.  2.  1.9 cm hypoechoic region in the right kidney may represent solid  mass. Recommend dedicated renal protocol MRI or CT for further  Characterization.     - Follow up MRI kidney without any renal mass    Independently reviewed labs and imaging.     Assessment/Plan: Ms. Gi Schmitz is a 42 year old woman with no significant past medical history who presents for evaluation of elevated LFTs, ultimately found to be AMA negative PBC. IgM was markedly elevated.     Discussed the natural history of primary biliary cholangitis.  She does not have any fibrosis on her liver biopsy.  Ursodiol is first-line therapy to prevent progression of her liver disease.  Started on ursodiol 9/2022, Alk phos almost normal after about a year of treatment    She should get annual thyroid screening, was normal May 2022. Added on to today's labs    Interpretor used today    Orders Placed This Encounter   Procedures    TSH with free T4 reflex     RTC 12 months with labs, will repeat imaging if concern for fibrosis progression on labs    Susi Mckeon MD MS  Hepatology/Liver Transplant  [facility]

## 2023-08-15 NOTE — LETTER
8/15/2023         RE: Sofia Rodriguez  [address]  [place] [address]        Dear Colleague,    Thank you for referring your patient, Sofia Rodriguez, to the [facility] [place]. Please see a copy of my visit note below.    [facility] Liver Clinic Return Patient Visit    Date of Visit: 8/15/2023    Reason for referral: elevated alkaline phosphatase    Subjective: Ms. 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     Initial History:     No previous known liver disease, imaging tests. No known history of liver decompensation. She was first told she had elevated LFTs a few months ago.     7/2022  ASMA negative  SNOW positive  AMA negative  AST 36 ALT 42 Alk phos 212    6/2022  Hepatitis A Total Ab reactive  Hepatitis B Sag, core ab negative   Hepatitis C Ab negative    5/2022   Alk phos 240    5/2021   Alk phos 202  AST 38  ALT 37  TSH normal    Rarely drinks alcohol. No family history of liver disease. No supplements.     She is asymptomatic. No abdominal pain. No nausea, vomiting.     Interval Events:   - Liver Bx 9/7/2022 RANDOM LIVER BIOPSY X 3 CORES:  - Portal based inflammation with focal florid duct lesions suggestive for primary biliary cholangitis without fibrosis.  - Started on ursodiol, having stomach pains with this, better if she takes its TID rather than all at once  - Alkaline phosphatase improved, almost normal  - Taking the ursodiol, sometimes will have stomach issues but ok with food    ROS: 14 point ROS negative except for positives noted in HPI.    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    PSHx:  Liver bx    FamHx:  No family history of liver disease, liver cancer    SocHx:  Social History     Socioeconomic History     Marital status: Single     Spouse name: Not on file     Number of children: Not on file      Years of education: Not on file     Highest education level: Not on file   Occupational History     Not on file   Tobacco Use     Smoking status: Never     Smokeless tobacco: Never   Substance and Sexual Activity     Alcohol use: Not on file     Drug use: Not on file     Sexual activity: Not on file   Other Topics Concern     Not on file   Social History Narrative     Not on file     Social Determinants of Health     Financial Resource Strain: Not on file   Food Insecurity: Not on file   Transportation Needs: Not on file   Physical Activity: Not on file   Stress: Not on file   Social Connections: Not on file   Intimate Partner Violence: Not on file   Housing Stability: Not on file   Drinks very rarely, last 6 months ago    Medications:  Current Outpatient Medications   Medication     Fe fumarate-B12-vit C-FA-IFC (FEROCON) capsule     ibuprofen (ADVIL/MOTRIN) 200 MG tablet     ursodiol (ACTIGALL) 300 MG capsule     ursodiol (ACTIGALL) 300 MG capsule     ursodiol (ACTIGALL) 300 MG capsule     No current facility-administered medications for this visit.     No OTCs, herbals    Allergies:  No Known Allergies    Objective:  BP 98/54   Pulse 71   Wt 56.7 kg (125 lb)   SpO2 97%   BMI 23.30 kg/m    Constitutional: pleasant woman in NAD  Eyes: non icteric  Respiratory: Normal respiratory excursion   MSK: normal range of motion of visualized extremities  Abd: Non distended  Skin: No jaundice  Psychiatric: normal mood and orientation    Labs:  Reviewed in EHR    Imaging:     RUQ US 8/31/2022    FINDINGS:   Fluid: No evidence of ascites or pleural effusions.     Liver: The liver demonstrates normal echotexture, measuring 14.1 cm in  craniocaudal dimension. There is no focal mass.      Gallbladder: There is no wall thickening, pericholecystic fluid,  positive sonographic Harper's sign or evidence for cholelithiasis.     Bile Ducts: Both the intra- and extrahepatic biliary system are of  normal caliber.  The common bile duct  measures 2 mm in diameter.     Pancreas: Visualized portions of the head and body of the pancreas are  unremarkable.      Kidney: The right kidney measures 12.7 cm long. There is a 1.9 x 1.4 x  1.3 cm ill-defined hypoechoic area in the mid to upper right kidney  adjacent to the liver.                                                                      IMPRESSION:   1.  Normal appearance of the liver.  2.  1.9 cm hypoechoic region in the right kidney may represent solid  mass. Recommend dedicated renal protocol MRI or CT for further  Characterization.     - Follow up MRI kidney without any renal mass    Independently reviewed labs and imaging.     Assessment/Plan: Ms. Gi Schmitz is a 42 year old woman with no significant past medical history who presents for evaluation of elevated LFTs, ultimately found to be AMA negative PBC. IgM was markedly elevated.     Discussed the natural history of primary biliary cholangitis.  She does not have any fibrosis on her liver biopsy.  Ursodiol is first-line therapy to prevent progression of her liver disease.  Started on ursodiol 9/2022, Alk phos almost normal after about a year of treatment    She should get annual thyroid screening, was normal May 2022. Added on to today's labs    Interpretor used today    Orders Placed This Encounter   Procedures     TSH with free T4 reflex     RTC 12 months with labs, will repeat imaging if concern for fibrosis progression on labs    Susi Mckeon MD MS  Hepatology/Liver Transplant  [facility]      Again, thank you for allowing me to participate in the care of your patient.        Sincerely,        Susi Mckeon MD

## 2024-08-01 DIAGNOSIS — K74.3 PRIMARY BILIARY CHOLANGITIS (H): Primary | ICD-10-CM

## 2024-11-06 ENCOUNTER — MEDICAL CORRESPONDENCE (OUTPATIENT)
Dept: HEALTH INFORMATION MANAGEMENT | Facility: CLINIC | Age: 44
End: 2024-11-06

## 2024-11-07 DIAGNOSIS — Z32.01 PREGNANCY TEST POSITIVE: Primary | ICD-10-CM

## 2024-12-04 ENCOUNTER — ANCILLARY PROCEDURE (OUTPATIENT)
Dept: ULTRASOUND IMAGING | Facility: CLINIC | Age: 44
End: 2024-12-04
Attending: MIDWIFE
Payer: COMMERCIAL

## 2024-12-04 DIAGNOSIS — Z32.01 PREGNANCY TEST POSITIVE: ICD-10-CM

## 2024-12-04 PROCEDURE — 76801 OB US < 14 WKS SINGLE FETUS: CPT | Performed by: RADIOLOGY

## 2024-12-04 PROCEDURE — 76817 TRANSVAGINAL US OBSTETRIC: CPT | Performed by: RADIOLOGY

## 2024-12-04 NOTE — NURSING NOTE
Due to patient being non-English speaking/uses sign language, an  was used for this visit. Only for face-to-face interpretation by an external agency, date and length of interpretation can be found on the scanned worksheet.     name: Felisha #[id number]  Agency:  [employer]  Language: Turkish   Telephone number: [phone]  Type of interpretation: Telephone, spoken

## 2024-12-06 ENCOUNTER — MEDICAL CORRESPONDENCE (OUTPATIENT)
Dept: HEALTH INFORMATION MANAGEMENT | Facility: CLINIC | Age: 44
End: 2024-12-06
Payer: COMMERCIAL

## 2024-12-18 ENCOUNTER — DOCUMENTATION ONLY (OUTPATIENT)
Dept: MATERNAL FETAL MEDICINE | Facility: CLINIC | Age: 44
End: 2024-12-18
Payer: COMMERCIAL

## 2024-12-18 NOTE — PROGRESS NOTES
Left message for [facility] to call back regarding Quantiferon testing. Received callback from Aleshia TENORIO RN at 1355. Aleshia states that Bianca Cobb CNM wrote note stating to treat patient as latent TB at this time as she is asymptomatic, plan to treat post partum, Aleshia will check with CNM regarding CXR.

## 2024-12-30 ENCOUNTER — OFFICE VISIT (OUTPATIENT)
Dept: INTERPRETER SERVICES | Facility: CLINIC | Age: 44
End: 2024-12-30
Attending: OBSTETRICS & GYNECOLOGY
Payer: COMMERCIAL

## 2024-12-30 ENCOUNTER — OFFICE VISIT (OUTPATIENT)
Dept: MATERNAL FETAL MEDICINE | Facility: CLINIC | Age: 44
End: 2024-12-30
Attending: OBSTETRICS & GYNECOLOGY
Payer: MEDICAID

## 2024-12-30 ENCOUNTER — MEDICAL CORRESPONDENCE (OUTPATIENT)
Dept: HEALTH INFORMATION MANAGEMENT | Facility: CLINIC | Age: 44
End: 2024-12-30

## 2024-12-30 ENCOUNTER — LAB (OUTPATIENT)
Dept: LAB | Facility: CLINIC | Age: 44
End: 2024-12-30
Attending: OBSTETRICS & GYNECOLOGY
Payer: MEDICAID

## 2024-12-30 ENCOUNTER — HOSPITAL ENCOUNTER (OUTPATIENT)
Dept: ULTRASOUND IMAGING | Facility: CLINIC | Age: 44
Discharge: HOME | End: 2024-12-30
Attending: OBSTETRICS & GYNECOLOGY
Payer: MEDICAID

## 2024-12-30 DIAGNOSIS — O09.521 MULTIGRAVIDA OF ADVANCED MATERNAL AGE IN FIRST TRIMESTER: ICD-10-CM

## 2024-12-30 DIAGNOSIS — O09.521 MULTIGRAVIDA OF ADVANCED MATERNAL AGE IN FIRST TRIMESTER: Primary | ICD-10-CM

## 2024-12-30 DIAGNOSIS — O26.90 PREGNANCY RELATED CONDITION, ANTEPARTUM: ICD-10-CM

## 2024-12-30 DIAGNOSIS — Z36.9 ENCOUNTER FOR ANTENATAL SCREENING OF MOTHER: ICD-10-CM

## 2024-12-30 DIAGNOSIS — Z31.5 ENCOUNTER FOR PROCREATIVE GENETIC COUNSELING AND TESTING: ICD-10-CM

## 2024-12-30 PROCEDURE — T1013 SIGN LANG/ORAL INTERPRETER: HCPCS

## 2024-12-30 PROCEDURE — 76813 OB US NUCHAL MEAS 1 GEST: CPT

## 2024-12-30 PROCEDURE — 96040 HC GENETIC COUNSELING, EACH 30 MINUTES: CPT

## 2024-12-30 PROCEDURE — 36415 COLL VENOUS BLD VENIPUNCTURE: CPT

## 2024-12-30 NOTE — PROGRESS NOTES
[facility]  Genetic Counseling Consult    Patient:  Sofia Rodriguez  Preferred Name: Sofia YOB: 1980   Date of Service:  24   MRN: [id number]    Sofia was seen at the [facility] for genetic consultation. The indication for genetic counseling is advanced maternal age. The patient was accompanied to this visit by their daughter.    This visit was facilitated in Italian, an  was offered and the patient declined.  IMPRESSION/ PLAN   1. Sofia has not had genetic screening in this pregnancy but elected to have screening today.     2. During today's [facility] visit, Sofia had a blood draw for non-invasive prenatal testing (also called NIPT, NIPS, or cell-free DNA) through [employer] This NIPT screens for trisomy 21, 18, and 13 and the patient opted to screen for sex chromosome aneuploidies, including reported fetal sex. Results are expected in 7-10 days. The patient will be called with results and if they do not answer they requested a detailed message with results on their voicemail, NOT including the predicted fetal sex information. Instead, they would like the sex information called out to her daughter, Hayley, at [phone]. A consent to communicate was signed.. Patient was informed that results, including fetal sex, will be available in [facility].    3. Since the patient chose aneuploidy screening via NIPT, quad screen is NOT recommended in the second trimester. If the patient desires screening for open neural tube defects, maternal serum AFP only is recommended, ideally between 16-18 weeks gestation.    4. Sofia had a nuchal translucency ultrasound today. Please see the ultrasound report for further details.    5. Further recommendations include a fetal anatomy level II ultrasound with [facility]. The upcoming ultrasound has been scheduled for 02/10/2025.    PREGNANCY HISTORY   /Parity:  "      Sofia's pregnancy history is significant for:   A son born vaginally in 2004 with a prior partner  A daughter born vaginally in 2005 with a prior partner  A son born vaginally in  with a prior partner  A IAB with a prior partner    CURRENT PREGNANCY   Current Age: 44 year old   Age at Delivery: 44 year old  MARCE: 7/10/2025, by Last Menstrual Period                                   Gestational Age: 12w4d  This pregnancy is a single gestation.   Sofia denies bleeding, complications, illnesses, fevers, and exposure concerns. She reports that she is taking prenatal vitamins and two medications for which she does not recall the names. Fall risk assessment was negative.    MEDICAL HISTORY   Sofia s reported medical history is not expected to impact pregnancy management or risks to fetal development.       FAMILY HISTORY   A three-generation pedigree was obtained today and is scanned under the \"Media\" tab in Epic. The family history was reported by Sofia and their daughter.    The following significant findings were reported today:   Sofia's mother had a stillborn child. Family history such as stillbirths can be difficult to assess if this occurred many decades ago since details are typically scarce. Therefore, it is challenging to assess if this family history modifies risks to the current pregnancy. This is often unlikely, especially if the history is several generations away from the current pregnancy. If more information is collected regarding this family history it should be revisited.   Sofia's maternal grandmother () had breast cancer. She is unsure at what age. Family history is otherwise negative for cancer. We discussed the family history of cancer briefly. Cancer most often occurs by chance; however, some families seem to develop cancer more frequently than expected. Everyone has a risk to develop cancer, but individuals may be at an increased risk to develop cancer based on their " family history. Cancer family history, even without genetic testing, can change cancer screening recommendations for family members and aid in insurance coverage for access to them as well. The most informative individuals to complete cancer genetic counseling and genetic testing are those with a personal history of cancer or those closely related to the affected individuals. If the family wants more information they can contact the [facility] Cancer Risk Management Program ([phone]).   Sofia does not have family history information about the father of the pregnancy (21y). She reports that he has no other children. This can be revisited if more information is learned.    Otherwise, the reported family history is unremarkable for multiple miscarriages, stillbirths, birth defects, intellectual disabilities, known genetic conditions, cancer <50y, and consanguinity.       RISK ASSESSMENT FOR INHERITED CONDITIONS AND CARRIER SCREENING OPTIONS   Expanded carrier screening is available to screen for autosomal recessive conditions and X-linked conditions in a large list of genes. Carrier screening does not test the pregnancy but gives a risk assessment for the pregnancy and future pregnancies to have the condition. Expanded carrier screening is designed to identify carrier status for conditions that are primarily childhood or adolescent onset. Expanded carrier screening does not evaluate for adult-onset conditions such as hereditary cancer syndromes, dementia/ Alzheimer's disease, or cardiovascular disease risk factors. Additionally, expanded carrier screening is not comprehensive for all known genetic diseases or inherited conditions. Carrier screening does not test for all genetic and health conditions or risk factors.     Autosomal recessive conditions happen when a mutation has been inherited from the egg and sperm and include conditions like cystic fibrosis, thalassemia, hearing loss, spinal muscular  atrophy, and more. We reviewed that when both biological parents carry a harmful genetic change in a gene associated with autosomal recessive inheritance, each of their pregnancies has a 1 in 4 (25%) chance to be affected by that condition. X-linked conditions happen when a mutation has been inherited from the egg and include conditions like fragile X syndrome.With x-linked conditions, the specific risk generally depends on the chromosomal sex of the fetus, with XY individuals (generally male) being most severely affected.     [place] screening was reviewed. [place]  Screening    The patient does NOT have a family history of known inherited conditions. This does NOT mean the patient and/or their partner is not a carrier of a condition. Approximately 90% of couples at an increased reproductive risk for an inherited condition have no family history of that condition.     The patient nor their partner have had carrier screening previously.     The patient declined the carrier screening options. They are aware the option will remain, and they can contact us if they would like to pursue screening.    RISK ASSESSMENT FOR CHROMOSOME CONDITIONS   We explained that the risk for fetal chromosome abnormalities increases with maternal age. We discussed specific features of common chromosome abnormalities, including trisomy 21 (Down syndrome), trisomy 13, trisomy 18, and sex chromosome trisomies.    At age 44 at midtrimester, the risk to have a baby with Down syndrome is 1 in 31.   At age 44 at midtrimester, the risk to have a baby with any chromosome abnormality is 1 in 19.     Sofia has not had genetic screening in this pregnancy but elected to have screening today.      GENETIC TESTING OPTIONS   Genetic testing during a pregnancy includes screening and diagnostic procedures.      Screening tests are non-invasive which means no risk to the pregnancy and includes ultrasounds and blood work. The benefits and limitations  of screening were reviewed. Screening tests provide a risk assessment (chance) specific to the pregnancy for certain fetal chromosome abnormalities but cannot definitively diagnose or exclude a fetal chromosome abnormality. Follow-up genetic counseling and consideration of diagnostic testing is recommended with any abnormal screening result. Diagnostic testing during a pregnancy is more certain and can test for more conditions. However, the tests do have a risk of miscarriage that requires careful consideration. These tests can detect fetal chromosome abnormalities with greater than 99% certainty. Results can be compromised by maternal cell contamination or mosaicism and are limited by the resolution of current genetic testing technology.     There is no screening or diagnostic test that detects all forms of birth defects or intellectual disability.     We discussed the following screening options:     Non-invasive prenatal testing (NIPT)  Also called cell-free DNA screening because it detects chromosomes from the placenta in the pregnant person's blood  Can be done any time after 10 weeks gestation  Standard recommendation for NIPT screens for trisomy 21, trisomy 18, trisomy 13, with the option of adding sex chromosome aneuploidies, without or without predicted sex  Cannot screen for open neural tube defects, maternal serum AFP after 15 weeks is recommended  New NIPT options include screening for other trisomies, microdeletion syndromes, and in some cases fetal blood antigens. Guidelines do not recommend these conditions are included in standard screening. These options have limitations and should be discussed with a genetic counselor.   However, current (2023) ACMG guidelines do recommend that screening for one microdeletion syndrome, called 22q11.2 deletion syndrome be offered to all pregnant patients. 22q11.2 deletion syndrome has an estimated prevalence of 1 in 990 to 1 in 2148 (0.05-0.1%). Risk is not thought  to increase with maternal age. Clinical features are variable but include congenital heart defects, cleft palate, developmental delays, immune system deficiencies, and hearing loss. Approximately 90% of cases are de dez (a sporadic new change in a pregnancy). Cell-free DNA screening for 22q11.2 deletion syndrome is available with the inclusion of other microdeletion syndromes. There is less data about the performance of cell-free DNA screening for more rare microdeletions and the chance for false positives or negative may be increased.  We discussed the limitations of cell-free DNA screening in detecting microdeletions and the possibility of false positives and false negatives. The patient declined microdeletion syndrome screening.    We discussed the following ultrasound options:    Nuchal translucency (NT) ultrasound  Ultrasound between 99p1x-45d8m that includes nuchal translucency measurement and nasal bone assessments  Nuchal translucency refers to the space at the back of the neck where fluid builds up. All babies at this stage have fluid and there is only concern if there is too much fluid  Nasal bone refers to the small bone in the nose. There is concern for conditions like Down syndrome if the bone cannot be seen at all  This ultrasound can be done as part of first trimester screening, at the same time as another screen (NIPT), at the same time as a CVS, or if the patients does not want genetic screening.  Markers on ultrasound detects about 70% of pregnancies with aneuploidy  Abnormalities on NT ultrasound can also increase the risk for a birth defect, like a heart defect    Comprehensive level II ultrasound (Fetal Anatomy Ultrasound)  Ultrasound done between 18-20 weeks gestation  Screens for major birth defects and markers for aneuploidy (like trisomy 21 and trisomy 18)  Includes looking at the fetus/baby's growth, heart, organs (stomach, kidneys), placenta, and amniotic fluid    We discussed the  following diagnostic options:     Chorionic villus sampling (CVS)  Invasive diagnostic procedure done between 10w0d and 13w6d  The procedure collects a small sample from the placenta for the purpose of chromosomal testing and/or other genetic testing  Diagnostic result; more than 99% sensitivity for fetal chromosome abnormalities  Cannot screen for open neural tube defects, maternal serum AFP after 15 weeks is recommended    Amniocentesis  Invasive diagnostic procedure done after 15 weeks gestation  The procedure collects a small sample of amniotic fluid for the purpose of chromosomal testing and/or other genetic testing  Diagnostic result; more than 99% sensitivity for fetal chromosome abnormalities  Testing for AFP in the amniotic fluid can test for open neural tube defects    Diagnostic testing was declined.    It was a pleasure to be involved with Sofia s care. Face-to-face time of the meeting was 30 minutes.    Nicole Womack GC, MS, [facility]  Board Certified and [place] Licensed Genetic Counselor  [facility]  Maternal Fetal Medicine  Office: [phone]  [facility]: [phone]   Fax: [phone]  [facility]

## 2024-12-30 NOTE — NURSING NOTE
Patient presents to [facility] for GC/NT at 12w3d due to AMA >40. Denies LOF, vaginal bleeding or cramping/contractions. SBAR given to GRETA MD, see their note in Epic.

## 2024-12-30 NOTE — PROGRESS NOTES
Please see full imaging report from [employer] program under imaging tab.    Perlita Elizabeth MD  Maternal Fetal Medicine

## 2024-12-31 ENCOUNTER — APPOINTMENT (OUTPATIENT)
Dept: INTERPRETER SERVICES | Facility: CLINIC | Age: 44
End: 2024-12-31
Payer: MEDICAID

## 2025-01-07 ENCOUNTER — TELEPHONE (OUTPATIENT)
Dept: MATERNAL FETAL MEDICINE | Facility: CLINIC | Age: 45
End: 2025-01-07
Payer: MEDICAID

## 2025-01-07 ENCOUNTER — APPOINTMENT (OUTPATIENT)
Dept: INTERPRETER SERVICES | Facility: CLINIC | Age: 45
End: 2025-01-07
Payer: MEDICAID

## 2025-01-07 LAB — SCANNED LAB RESULT: NORMAL

## 2025-01-07 NOTE — TELEPHONE ENCOUNTER
January 7, 2025    I spoke with Sofia regarding her non-invasive prenatal test (NIPT) results. Called with [facility]  Rui.    Results indicate NO ANEUPLOIDY DETECTED for chromosomes 21, 18, 13, or the sex chromosomes (the predicted fetal sex information was left on the voicemail of Sofia Hardy's daughter, per the plan made at Sofia's visit on 12/30).     This puts her current pregnancy at low risk for Down syndrome, trisomy 18, trisomy 13 and sex chromosome abnormalities. This test is reported to have the following sensitivities: Down syndrome: 99.7%, trisomy 18: 97.9%, and trisomy 13: 99.0%. Although these results are reassuring, this does not replace a standard chromosome analysis from a chorionic villus sampling or amniocentesis.     Level II ultrasound is recommended, given AMA, and has been scheduled for 02/10/2025.    MSAFP is the appropriate second trimester screening test for open neural tube defects; the maternal quad screen is not recommended.    Her results will be made available in her Epic chart for her primary OB to review.       Alee Jeff MS, [facility]  Maternal Fetal Medicine  Office: [phone]  [facility]: [phone]   Fax: [phone]  [facility]

## 2025-02-10 ENCOUNTER — HOSPITAL ENCOUNTER (OUTPATIENT)
Dept: ULTRASOUND IMAGING | Facility: CLINIC | Age: 45
Discharge: HOME | End: 2025-02-10
Attending: STUDENT IN AN ORGANIZED HEALTH CARE EDUCATION/TRAINING PROGRAM
Payer: MEDICAID

## 2025-02-10 ENCOUNTER — OFFICE VISIT (OUTPATIENT)
Dept: MATERNAL FETAL MEDICINE | Facility: CLINIC | Age: 45
End: 2025-02-10
Attending: STUDENT IN AN ORGANIZED HEALTH CARE EDUCATION/TRAINING PROGRAM
Payer: MEDICAID

## 2025-02-10 DIAGNOSIS — O35.BXX0 FETAL VENTRICULAR SEPTAL DEFECT AFFECTING ANTEPARTUM CARE OF MOTHER, SINGLE OR UNSPECIFIED FETUS: Primary | ICD-10-CM

## 2025-02-10 DIAGNOSIS — O09.522 MULTIGRAVIDA OF ADVANCED MATERNAL AGE IN SECOND TRIMESTER: ICD-10-CM

## 2025-02-10 DIAGNOSIS — O09.521 MULTIGRAVIDA OF ADVANCED MATERNAL AGE IN FIRST TRIMESTER: ICD-10-CM

## 2025-02-10 PROCEDURE — 76811 OB US DETAILED SNGL FETUS: CPT

## 2025-02-10 NOTE — NURSING NOTE
[facility] used for MFM visit. Patient reports positive fetal movement,  denies contractions, leaking of fluid, or bleeding. SBAR given to MFM MD, see their note in Epic.

## 2025-02-10 NOTE — PROGRESS NOTES
"Please see \"Imaging\" tab under \"Chart Review\" for details of today's visit.    Laurie Luna    "

## 2025-02-13 ENCOUNTER — HOSPITAL ENCOUNTER (OUTPATIENT)
Facility: CLINIC | Age: 45
End: 2025-02-13
Attending: FAMILY MEDICINE | Admitting: FAMILY MEDICINE
Payer: COMMERCIAL

## 2025-02-13 ENCOUNTER — HOSPITAL ENCOUNTER (INPATIENT)
Facility: CLINIC | Age: 45
DRG: 832 | End: 2025-02-13
Attending: EMERGENCY MEDICINE | Admitting: STUDENT IN AN ORGANIZED HEALTH CARE EDUCATION/TRAINING PROGRAM
Payer: COMMERCIAL

## 2025-02-13 ENCOUNTER — APPOINTMENT (OUTPATIENT)
Dept: GENERAL RADIOLOGY | Facility: CLINIC | Age: 45
DRG: 832 | End: 2025-02-13
Payer: COMMERCIAL

## 2025-02-13 ENCOUNTER — APPOINTMENT (OUTPATIENT)
Dept: ULTRASOUND IMAGING | Facility: CLINIC | Age: 45
DRG: 832 | End: 2025-02-13
Payer: COMMERCIAL

## 2025-02-13 VITALS
HEIGHT: 64 IN | BODY MASS INDEX: 22.83 KG/M2 | DIASTOLIC BLOOD PRESSURE: 68 MMHG | RESPIRATION RATE: 18 BRPM | WEIGHT: 133.7 LBS | HEART RATE: 94 BPM | TEMPERATURE: 98.5 F | SYSTOLIC BLOOD PRESSURE: 109 MMHG | OXYGEN SATURATION: 99 %

## 2025-02-13 DIAGNOSIS — Z3A.18 18 WEEKS GESTATION OF PREGNANCY: ICD-10-CM

## 2025-02-13 DIAGNOSIS — O26.832: ICD-10-CM

## 2025-02-13 DIAGNOSIS — Z32.01 PREGNANCY TEST POSITIVE: ICD-10-CM

## 2025-02-13 DIAGNOSIS — N12 PYELONEPHRITIS: ICD-10-CM

## 2025-02-13 DIAGNOSIS — Z22.7 TB LUNG, LATENT: Primary | ICD-10-CM

## 2025-02-13 LAB
ALBUMIN SERPL BCG-MCNC: 3.4 G/DL (ref 3.5–5.2)
ALBUMIN UR-MCNC: 70 MG/DL
ALP SERPL-CCNC: 165 U/L (ref 40–150)
ALT SERPL W P-5'-P-CCNC: 24 U/L (ref 0–50)
ANION GAP SERPL CALCULATED.3IONS-SCNC: 12 MMOL/L (ref 7–15)
APPEARANCE UR: ABNORMAL
AST SERPL W P-5'-P-CCNC: 22 U/L (ref 0–45)
BACTERIA #/AREA URNS HPF: ABNORMAL /HPF
BASOPHILS # BLD AUTO: 0 10E3/UL (ref 0–0.2)
BASOPHILS NFR BLD AUTO: 0 %
BILIRUB SERPL-MCNC: 0.8 MG/DL
BILIRUB UR QL STRIP: NEGATIVE
BUN SERPL-MCNC: 9.4 MG/DL (ref 6–20)
CALCIUM SERPL-MCNC: 8.9 MG/DL (ref 8.8–10.4)
CHLORIDE SERPL-SCNC: 104 MMOL/L (ref 98–107)
COLOR UR AUTO: YELLOW
CREAT SERPL-MCNC: 0.72 MG/DL (ref 0.51–0.95)
EGFRCR SERPLBLD CKD-EPI 2021: >90 ML/MIN/1.73M2
EOSINOPHIL # BLD AUTO: 0 10E3/UL (ref 0–0.7)
EOSINOPHIL NFR BLD AUTO: 0 %
ERYTHROCYTE [DISTWIDTH] IN BLOOD BY AUTOMATED COUNT: 14.6 % (ref 10–15)
GLUCOSE SERPL-MCNC: 100 MG/DL (ref 70–99)
GLUCOSE UR STRIP-MCNC: 30 MG/DL
HCO3 SERPL-SCNC: 19 MMOL/L (ref 22–29)
HCT VFR BLD AUTO: 31.3 % (ref 35–47)
HGB BLD-MCNC: 10.8 G/DL (ref 11.7–15.7)
HGB UR QL STRIP: ABNORMAL
IMM GRANULOCYTES # BLD: 0.1 10E3/UL
IMM GRANULOCYTES NFR BLD: 1 %
KETONES UR STRIP-MCNC: 20 MG/DL
LEUKOCYTE ESTERASE UR QL STRIP: ABNORMAL
LYMPHOCYTES # BLD AUTO: 0.9 10E3/UL (ref 0.8–5.3)
LYMPHOCYTES NFR BLD AUTO: 6 %
MCH RBC QN AUTO: 31.1 PG (ref 26.5–33)
MCHC RBC AUTO-ENTMCNC: 34.5 G/DL (ref 31.5–36.5)
MCV RBC AUTO: 90 FL (ref 78–100)
MONOCYTES # BLD AUTO: 0.6 10E3/UL (ref 0–1.3)
MONOCYTES NFR BLD AUTO: 4 %
MUCOUS THREADS #/AREA URNS LPF: PRESENT /LPF
NEUTROPHILS # BLD AUTO: 13.8 10E3/UL (ref 1.6–8.3)
NEUTROPHILS NFR BLD AUTO: 89 %
NITRATE UR QL: POSITIVE
NRBC # BLD AUTO: 0 10E3/UL
NRBC BLD AUTO-RTO: 0 /100
PH UR STRIP: 6.5 [PH] (ref 5–7)
PLATELET # BLD AUTO: 242 10E3/UL (ref 150–450)
POTASSIUM SERPL-SCNC: 3.2 MMOL/L (ref 3.4–5.3)
PROT SERPL-MCNC: 7.2 G/DL (ref 6.4–8.3)
RBC # BLD AUTO: 3.47 10E6/UL (ref 3.8–5.2)
RBC URINE: 15 /HPF
SODIUM SERPL-SCNC: 135 MMOL/L (ref 135–145)
SP GR UR STRIP: 1.01 (ref 1–1.03)
SQUAMOUS EPITHELIAL: 1 /HPF
TRANSITIONAL EPI: 1 /HPF
UROBILINOGEN UR STRIP-MCNC: NORMAL MG/DL
WBC # BLD AUTO: 15.4 10E3/UL (ref 4–11)
WBC CLUMPS #/AREA URNS HPF: PRESENT /HPF
WBC URINE: >182 /HPF

## 2025-02-13 PROCEDURE — 85025 COMPLETE CBC W/AUTO DIFF WBC: CPT

## 2025-02-13 PROCEDURE — 87186 SC STD MICRODIL/AGAR DIL: CPT | Performed by: EMERGENCY MEDICINE

## 2025-02-13 PROCEDURE — 81001 URINALYSIS AUTO W/SCOPE: CPT

## 2025-02-13 PROCEDURE — 36415 COLL VENOUS BLD VENIPUNCTURE: CPT

## 2025-02-13 PROCEDURE — 96365 THER/PROPH/DIAG IV INF INIT: CPT | Performed by: EMERGENCY MEDICINE

## 2025-02-13 PROCEDURE — 250N000011 HC RX IP 250 OP 636: Performed by: EMERGENCY MEDICINE

## 2025-02-13 PROCEDURE — 85048 AUTOMATED LEUKOCYTE COUNT: CPT

## 2025-02-13 PROCEDURE — 87040 BLOOD CULTURE FOR BACTERIA: CPT | Performed by: EMERGENCY MEDICINE

## 2025-02-13 PROCEDURE — 36415 COLL VENOUS BLD VENIPUNCTURE: CPT | Performed by: EMERGENCY MEDICINE

## 2025-02-13 PROCEDURE — 71046 X-RAY EXAM CHEST 2 VIEWS: CPT

## 2025-02-13 PROCEDURE — 96361 HYDRATE IV INFUSION ADD-ON: CPT | Performed by: EMERGENCY MEDICINE

## 2025-02-13 PROCEDURE — 250N000013 HC RX MED GY IP 250 OP 250 PS 637: Performed by: EMERGENCY MEDICINE

## 2025-02-13 PROCEDURE — 258N000003 HC RX IP 258 OP 636

## 2025-02-13 PROCEDURE — 82040 ASSAY OF SERUM ALBUMIN: CPT

## 2025-02-13 PROCEDURE — 76770 US EXAM ABDO BACK WALL COMP: CPT

## 2025-02-13 PROCEDURE — 99285 EMERGENCY DEPT VISIT HI MDM: CPT | Mod: 25 | Performed by: EMERGENCY MEDICINE

## 2025-02-13 PROCEDURE — 250N000013 HC RX MED GY IP 250 OP 250 PS 637

## 2025-02-13 PROCEDURE — 87186 SC STD MICRODIL/AGAR DIL: CPT

## 2025-02-13 PROCEDURE — 120N000002 HC R&B MED SURG/OB UMMC

## 2025-02-13 PROCEDURE — 87149 DNA/RNA DIRECT PROBE: CPT | Performed by: EMERGENCY MEDICINE

## 2025-02-13 PROCEDURE — 82435 ASSAY OF BLOOD CHLORIDE: CPT

## 2025-02-13 PROCEDURE — 99285 EMERGENCY DEPT VISIT HI MDM: CPT | Mod: GC | Performed by: EMERGENCY MEDICINE

## 2025-02-13 RX ORDER — PRENATAL VIT/IRON FUM/FOLIC AC 27MG-0.8MG
1 TABLET ORAL DAILY
COMMUNITY

## 2025-02-13 RX ORDER — ASPIRIN 81 MG/1
1 TABLET ORAL DAILY
COMMUNITY
Start: 2024-12-17

## 2025-02-13 RX ORDER — ACETAMINOPHEN 325 MG/1
975 TABLET ORAL ONCE
Status: COMPLETED | OUTPATIENT
Start: 2025-02-13 | End: 2025-02-13

## 2025-02-13 RX ORDER — POTASSIUM CHLORIDE 20MEQ/15ML
40 LIQUID (ML) ORAL ONCE
Status: COMPLETED | OUTPATIENT
Start: 2025-02-13 | End: 2025-02-13

## 2025-02-13 RX ORDER — LEVOTHYROXINE SODIUM 50 UG/1
50 TABLET ORAL
COMMUNITY
Start: 2024-12-17

## 2025-02-13 RX ORDER — CEFTRIAXONE 1 G/1
1 INJECTION, POWDER, FOR SOLUTION INTRAMUSCULAR; INTRAVENOUS ONCE
Status: COMPLETED | OUTPATIENT
Start: 2025-02-13 | End: 2025-02-13

## 2025-02-13 RX ADMIN — CEFTRIAXONE 1 G: 1 INJECTION, POWDER, FOR SOLUTION INTRAMUSCULAR; INTRAVENOUS at 18:26

## 2025-02-13 RX ADMIN — SODIUM CHLORIDE 500 ML: 9 INJECTION, SOLUTION INTRAVENOUS at 12:49

## 2025-02-13 RX ADMIN — POTASSIUM CHLORIDE 40 MEQ: 40 SOLUTION ORAL at 12:53

## 2025-02-13 RX ADMIN — ACETAMINOPHEN 975 MG: 325 TABLET, FILM COATED ORAL at 12:46

## 2025-02-13 ASSESSMENT — ACTIVITIES OF DAILY LIVING (ADL)
ADLS_ACUITY_SCORE: 41

## 2025-02-13 ASSESSMENT — COLUMBIA-SUICIDE SEVERITY RATING SCALE - C-SSRS
2. HAVE YOU ACTUALLY HAD ANY THOUGHTS OF KILLING YOURSELF IN THE PAST MONTH?: NO
6. HAVE YOU EVER DONE ANYTHING, STARTED TO DO ANYTHING, OR PREPARED TO DO ANYTHING TO END YOUR LIFE?: NO
1. IN THE PAST MONTH, HAVE YOU WISHED YOU WERE DEAD OR WISHED YOU COULD GO TO SLEEP AND NOT WAKE UP?: NO
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
## 2025-02-13 NOTE — LETTER
February 16, 2025      To Whom It May Concern:      Sofia has been in the hospital for treatment for her pregnancy. She was unable to attend work on Friday, 2/14. Please excuse her for this time. Additionally, she should not life more than 20 lbs for the duration of her pregnancy.     Sincerely,        Joanne Galvez MD  Electronically signed

## 2025-02-13 NOTE — ED PROVIDER NOTES
"ED Provider Note  [facility]      History     Chief Complaint   Patient presents with    Abdominal Pain    Fall     HPI  Sofia Rodriguez is a 44 year old female 18w pregnant that suffered a fall 4 days ago on Monday while getting out of her car, she slipped and hit her right knee and right buttock. She reports mild pain but since yesterday the pain went up to the right flank and has been 8/10 of intensity since then, she mentions that feels like a cramp. She also report suprapubic tenderness, but denies any vagina discharge or bleeding. She did not take any tylenol for pain. Beside this, she reports fever sensation and burning with urination for a couple of days. She reports some chest pain associated with the back pain.    {History Review Selection (Optional):216646}  {ROS Selection (Optional):570801}    Physical Exam   BP: 106/66  Pulse: 91  Temp: 98.7  F (37.1  C)  Resp: 16  Height: 162.6 cm (5' 4\")  Weight: 60.6 kg (133 lb 11.2 oz)  SpO2: 99 %  Physical Exam  Constitutional:       General: She is in acute distress.   Cardiovascular:      Rate and Rhythm: Normal rate.   Pulmonary:      Effort: Pulmonary effort is normal.      Breath sounds: Normal breath sounds.   Abdominal:      Palpations: Abdomen is soft.      Tenderness: There is abdominal tenderness in the suprapubic area.   Genitourinary:     Uterus: Enlarged.       Adnexa: Right adnexa normal and left adnexa normal.      Comments: 18 weeks pregnancy tender to palpation at suprapubic area          ED Course, Procedures, & Data      Procedures       {ED Course Selections (Optional):626538}  {ED Sepsis CMS Documentation (Optional):879434::\" \"}       Results for orders placed or performed during the hospital encounter of 02/13/25   CBC with platelets differential     Status: None ()    Narrative    The following orders were created for panel order CBC with platelets differential.  Procedure                               " Abnormality         Status                     ---------                               -----------         ------                     CBC with platelets and d...[[id number]]                                                   Please view results for these tests on the individual orders.     Medications   sodium chloride 0.9% BOLUS 500 mL (has no administration in time range)   acetaminophen (TYLENOL) tablet 975 mg (has no administration in time range)     Labs Ordered and Resulted from Time of ED Arrival to Time of ED Departure - No data to display  US Renal Complete Non-Vascular    (Results Pending)   XR Chest 2 Views    (Results Pending)          {Critical Care Performed?:820526}    Assessment & Plan    45 yo female on her 4th prengancy with 18w6d with back trauma after a fall, currently with right flanck and lower back pain and urinary symptoms. Reveals that she has past history of UTI in the past. At exam tenderness on right flank, and suprapubic tenderness. This new pain could be reated to the fall 4 days ago but also could be another separate entity as she is presenting with a  couple of days with urinary symptomatology. Low yield of uterine trauma, we will monitor fetal heart rate and any other alarm signs like vaginal bleeding or increase suprapubic pain.    PLAN:  CBC, cMP, UA  Chest xray  US kidneys    Update:  Fetal     I have reviewed the nursing notes. I have reviewed the findings, diagnosis, plan and need for follow up with the patient.    New Prescriptions    No medications on file       Final diagnoses:   None       Osiel Abdi***  [facility] EMERGENCY DEPARTMENT  2/13/2025   Small amount of ascites in the pelvis measuring 2 cm in AP diameter.  3.  Color overlay demonstrates bilateral ureteral jets without any calculi by ultrasound.  4.  Depending on patient's symptoms, consider further evaluation with OB/pelvic US or MRI of the abdomen.   XR Chest 2 Views     Status: None    Narrative    EXAM: XR CHEST 2 VIEWS  LOCATION: [facility]  DATE: 2/13/2025    INDICATION: Pain. r o rib fracture  COMPARISON: None.      Impression    IMPRESSION: Negative chest. Nothing concerning for rib fracture of the chest x-ray.   Comprehensive metabolic panel     Status: Abnormal   Result Value Ref Range    Sodium 135 135 - 145 mmol/L    Potassium 3.2 (L) 3.4 - 5.3 mmol/L    Carbon Dioxide (CO2) 19 (L) 22 - 29 mmol/L    Anion Gap 12 7 - 15 mmol/L    Urea Nitrogen 9.4 6.0 - 20.0 mg/dL    Creatinine 0.72 0.51 - 0.95 mg/dL    GFR Estimate >90 >60 mL/min/1.73m2    Calcium 8.9 8.8 - 10.4 mg/dL    Chloride 104 98 - 107 mmol/L    Glucose 100 (H) 70 - 99 mg/dL    Alkaline Phosphatase 165 (H) 40 - 150 U/L    AST 22 0 - 45 U/L    ALT 24 0 - 50 U/L    Protein Total 7.2 6.4 - 8.3 g/dL    Albumin 3.4 (L) 3.5 - 5.2 g/dL    Bilirubin Total 0.8 <=1.2 mg/dL   UA with Microscopic reflex to Culture     Status: Abnormal    Specimen: Urine, Midstream   Result Value Ref Range    Color Urine Yellow Colorless, Straw, Light Yellow, Yellow    Appearance Urine Cloudy (A) Clear    Glucose Urine 30 (A) Negative mg/dL    Bilirubin Urine Negative Negative    Ketones Urine 20 (A) Negative mg/dL    Specific Gravity Urine 1.009 1.003 - 1.035    Blood Urine Moderate (A) Negative    pH Urine 6.5 5.0 - 7.0    Protein Albumin Urine 70 (A) Negative mg/dL    Urobilinogen Urine Normal Normal, 2.0 mg/dL    Nitrite Urine Positive (A) Negative    Leukocyte Esterase Urine Large (A) Negative    Bacteria Urine Moderate (A) None Seen /HPF    WBC Clumps Urine Present (A) None Seen /HPF    Mucus Urine Present (A)  None Seen /LPF    RBC Urine 15 (H) <=2 /HPF    WBC Urine >182 (H) <=5 /HPF    Squamous Epithelials Urine 1 <=1 /HPF    Transitional Epithelials Urine 1 <=1 /HPF    Narrative    Urine Culture ordered based on laboratory criteria   CBC with platelets and differential     Status: Abnormal   Result Value Ref Range    WBC Count 15.4 (H) 4.0 - 11.0 10e3/uL    RBC Count 3.47 (L) 3.80 - 5.20 10e6/uL    Hemoglobin 10.8 (L) 11.7 - 15.7 g/dL    Hematocrit 31.3 (L) 35.0 - 47.0 %    MCV 90 78 - 100 fL    MCH 31.1 26.5 - 33.0 pg    MCHC 34.5 31.5 - 36.5 g/dL    RDW 14.6 10.0 - 15.0 %    Platelet Count 242 150 - 450 10e3/uL    % Neutrophils 89 %    % Lymphocytes 6 %    % Monocytes 4 %    % Eosinophils 0 %    % Basophils 0 %    % Immature Granulocytes 1 %    NRBCs per 100 WBC 0 <1 /100    Absolute Neutrophils 13.8 (H) 1.6 - 8.3 10e3/uL    Absolute Lymphocytes 0.9 0.8 - 5.3 10e3/uL    Absolute Monocytes 0.6 0.0 - 1.3 10e3/uL    Absolute Eosinophils 0.0 0.0 - 0.7 10e3/uL    Absolute Basophils 0.0 0.0 - 0.2 10e3/uL    Absolute Immature Granulocytes 0.1 <=0.4 10e3/uL    Absolute NRBCs 0.0 10e3/uL   CBC with platelets     Status: Abnormal   Result Value Ref Range    WBC Count 18.6 (H) 4.0 - 11.0 10e3/uL    RBC Count 3.44 (L) 3.80 - 5.20 10e6/uL    Hemoglobin 10.8 (L) 11.7 - 15.7 g/dL    Hematocrit 31.7 (L) 35.0 - 47.0 %    MCV 92 78 - 100 fL    MCH 31.4 26.5 - 33.0 pg    MCHC 34.1 31.5 - 36.5 g/dL    RDW 14.9 10.0 - 15.0 %    Platelet Count 218 150 - 450 10e3/uL   Comprehensive metabolic panel     Status: Abnormal   Result Value Ref Range    Sodium 135 135 - 145 mmol/L    Potassium 3.2 (L) 3.4 - 5.3 mmol/L    Carbon Dioxide (CO2) 16 (L) 22 - 29 mmol/L    Anion Gap 12 7 - 15 mmol/L    Urea Nitrogen 8.6 6.0 - 20.0 mg/dL    Creatinine 0.69 0.51 - 0.95 mg/dL    GFR Estimate >90 >60 mL/min/1.73m2    Calcium 9.1 8.8 - 10.4 mg/dL    Chloride 107 98 - 107 mmol/L    Glucose 94 70 - 99 mg/dL    Alkaline Phosphatase 163 (H) 40 - 150 U/L    AST 19  0 - 45 U/L    ALT 22 0 - 50 U/L    Protein Total 7.1 6.4 - 8.3 g/dL    Albumin 3.2 (L) 3.5 - 5.2 g/dL    Bilirubin Total 0.6 <=1.2 mg/dL   Extra Tube     Status: None    Narrative    The following orders were created for panel order Extra Tube.  Procedure                               Abnormality         Status                     ---------                               -----------         ------                     Extra Serum Separator Tu...[[id number]]                      Final result                 Please view results for these tests on the individual orders.   Extra Serum Separator Tube (SST)     Status: None   Result Value Ref Range    Hold Specimen JIC    Potassium     Status: Normal   Result Value Ref Range    Potassium 3.4 3.4 - 5.3 mmol/L   TSH with free T4 reflex     Status: Normal   Result Value Ref Range    TSH 2.27 0.30 - 4.20 uIU/mL   Urine Culture     Status: Abnormal    Specimen: Urine, Midstream   Result Value Ref Range    Culture >100,000 CFU/mL Escherichia coli (A)        Susceptibility    Escherichia coli - SOFIYA     Ampicillin  Susceptible ug/mL     Ampicillin/ Sulbactam  Susceptible ug/mL     Piperacillin/Tazobactam  Susceptible ug/mL     Cefazolin  Susceptible ug/mL     Ceftazidime  Susceptible ug/mL     Ceftriaxone  Susceptible ug/mL     Cefepime  Susceptible ug/mL     Gentamicin  Susceptible ug/mL     Ciprofloxacin  Susceptible ug/mL     Levofloxacin  Susceptible ug/mL     Nitrofurantoin  Susceptible ug/mL     Trimethoprim/Sulfamethoxazole  Susceptible ug/mL   Blood Culture Peripheral Blood     Status: Abnormal (Preliminary result)    Specimen: Peripheral Blood   Result Value Ref Range    Culture Positive on the 1st day of incubation (A)     Culture Escherichia coli (AA)    Verigene GN Panel     Status: Abnormal    Specimen: Peripheral Blood   Result Value Ref Range    Acinetobacter species Not Detected Not Detected    Citrobacter species Not Detected Not Detected    Enterobacter species Not  Detected Not Detected    Proteus species Not Detected Not Detected    Escherichia coli Detected (A) Not Detected    Klebsiella pneumoniae Not Detected Not Detected    Klebsiella oxytoca Not Detected Not Detected    Pseudomonas aeruginosa Not Detected Not Detected    CTX-M Not Detected Not Detected, NA    KPC Not Detected Not Detected, NA    NDM Not Detected Not Detected, NA    VIM Not Detected Not Detected, NA    IMP Not Detected Not Detected, NA    OXA Not Detected Not Detected, NA    Narrative    Specimen tested with Verigene multiplex, gram-negative blood culture nucleic acid test for the following targets: Acinetobacter species, Citrobacter species, Enterobacter species, Proteus species, Escherichia coli, Klebsiella pneumoniae, Klebsiella oxytoca, Pseudomonas aeruginosa, and the following resistance markers: CTX-M, KPC, NDM, VIM, IMP and OXA.   Blood Culture Hand, Left     Status: Normal (Preliminary result)    Specimen: Hand, Left; Blood   Result Value Ref Range    Culture No growth after 12 hours    Blood Culture Arm, Right     Status: Normal (Preliminary result)    Specimen: Arm, Right; Blood   Result Value Ref Range    Culture No growth after 12 hours    CBC with platelets differential     Status: Abnormal    Narrative    The following orders were created for panel order CBC with platelets differential.  Procedure                               Abnormality         Status                     ---------                               -----------         ------                     CBC with platelets and d...[[id number]]  Abnormal            Final result                 Please view results for these tests on the individual orders.     Medications   lidocaine 1 % 0.1-1 mL (has no administration in time range)   lidocaine (LMX4) cream (has no administration in time range)   sodium chloride (PF) 0.9% PF flush 3 mL (3 mLs Intracatheter $Given 2/15/25 0009)   sodium chloride (PF) 0.9% PF flush 3 mL (has no administration  in time range)   senna-docusate (SENOKOT-S/PERICOLACE) 8.6-50 MG per tablet 1 tablet (has no administration in time range)     Or   senna-docusate (SENOKOT-S/PERICOLACE) 8.6-50 MG per tablet 2 tablet (has no administration in time range)   calcium carbonate (TUMS) chewable tablet 1,000 mg (has no administration in time range)   acetaminophen (TYLENOL) tablet 650 mg (650 mg Oral $Given 2/15/25 0009)     Or   acetaminophen (TYLENOL) Suppository 650 mg ( Rectal See Alternative 2/15/25 0009)   oxyCODONE IR (ROXICODONE) half-tab 2.5 mg (has no administration in time range)   oxyCODONE (ROXICODONE) tablet 5 mg (5 mg Oral $Given 2/14/25 1502)   ondansetron (ZOFRAN ODT) ODT tab 4 mg (has no administration in time range)     Or   ondansetron (ZOFRAN) injection 4 mg (has no administration in time range)   prochlorperazine (COMPAZINE) injection 10 mg (has no administration in time range)     Or   prochlorperazine (COMPAZINE) tablet 10 mg (has no administration in time range)   prenatal multivitamin w/iron per tablet 1 tablet (1 tablet Oral $Given 2/14/25 0822)   naloxone (NARCAN) injection 0.2 mg (has no administration in time range)     Or   naloxone (NARCAN) injection 0.4 mg (has no administration in time range)     Or   naloxone (NARCAN) injection 0.2 mg (has no administration in time range)     Or   naloxone (NARCAN) injection 0.4 mg (has no administration in time range)   cefTRIAXone (ROCEPHIN) 2 g vial to attach to  ml bag for ADULTS or NS 50 ml bag for PEDS (2 g Intravenous $New Bag 2/14/25 1042)   sodium chloride 0.9% BOLUS 500 mL (0 mLs Intravenous Stopped 2/13/25 1340)   acetaminophen (TYLENOL) tablet 975 mg (975 mg Oral $Given 2/13/25 1246)   potassium chloride (KAYCIEL) solution 40 mEq (40 mEq Oral $Given 2/13/25 1253)   cefTRIAXone (ROCEPHIN) 1 g vial to attach to  mL bag for ADULTS or NS 50 mL bag for PEDS (0 g Intravenous Stopped 2/13/25 1848)   potassium chloride rachel ER (KLOR-CON M10) CR tablet 40  mEq (40 mEq Oral $Given 2/14/25 0822)     Labs Ordered and Resulted from Time of ED Arrival to Time of ED Departure   COMPREHENSIVE METABOLIC PANEL - Abnormal       Result Value    Sodium 135      Potassium 3.2 (*)     Carbon Dioxide (CO2) 19 (*)     Anion Gap 12      Urea Nitrogen 9.4      Creatinine 0.72      GFR Estimate >90      Calcium 8.9      Chloride 104      Glucose 100 (*)     Alkaline Phosphatase 165 (*)     AST 22      ALT 24      Protein Total 7.2      Albumin 3.4 (*)     Bilirubin Total 0.8     ROUTINE UA WITH MICROSCOPIC REFLEX TO CULTURE - Abnormal    Color Urine Yellow      Appearance Urine Cloudy (*)     Glucose Urine 30 (*)     Bilirubin Urine Negative      Ketones Urine 20 (*)     Specific Gravity Urine 1.009      Blood Urine Moderate (*)     pH Urine 6.5      Protein Albumin Urine 70 (*)     Urobilinogen Urine Normal      Nitrite Urine Positive (*)     Leukocyte Esterase Urine Large (*)     Bacteria Urine Moderate (*)     WBC Clumps Urine Present (*)     Mucus Urine Present (*)     RBC Urine 15 (*)     WBC Urine >182 (*)     Squamous Epithelials Urine 1      Transitional Epithelials Urine 1     CBC WITH PLATELETS AND DIFFERENTIAL - Abnormal    WBC Count 15.4 (*)     RBC Count 3.47 (*)     Hemoglobin 10.8 (*)     Hematocrit 31.3 (*)     MCV 90      MCH 31.1      MCHC 34.5      RDW 14.6      Platelet Count 242      % Neutrophils 89      % Lymphocytes 6      % Monocytes 4      % Eosinophils 0      % Basophils 0      % Immature Granulocytes 1      NRBCs per 100 WBC 0      Absolute Neutrophils 13.8 (*)     Absolute Lymphocytes 0.9      Absolute Monocytes 0.6      Absolute Eosinophils 0.0      Absolute Basophils 0.0      Absolute Immature Granulocytes 0.1      Absolute NRBCs 0.0       XR Chest 2 Views   Final Result   IMPRESSION: Negative chest. Nothing concerning for rib fracture of the chest x-ray.      US Renal Complete Non-Vascular   Final Result   IMPRESSION:   1.  Mild distention of the right  renal pelvis, clearly progressed since 2022 without evidence of calculi.   2.  Small amount of ascites in the pelvis measuring 2 cm in AP diameter.   3.  Color overlay demonstrates bilateral ureteral jets without any calculi by ultrasound.   4.  Depending on patient's symptoms, consider further evaluation with OB/pelvic US or MRI of the abdomen.               Assessment & Plan    43 yo female on her 4th prengancy with 18w6d with back trauma after a fall, currently with right flanck and lower back pain and urinary symptoms. Reveals that she has past history of UTI in the past. At exam tenderness on right flank, and suprapubic tenderness. This new pain could be reated to the fall 4 days ago but also could be another separate entity as she is presenting with a  couple of days with urinary symptomatology. Low yield of uterine trauma, we will monitor fetal heart rate and any other alarm signs like vaginal bleeding or increase suprapubic pain.    PLAN:  CBC, cMP, UA  Chest xray  US kidneys    Update:  Fetal     Discussed with OB/GYN felt that fetal heart rate monitoring would suffice. Chest x-ray without acute rib fracture.  Patient has a significantly elevated white count hemoglobin stable.  Reassuring against intra-abdominal bleeding.    Patient's urine appears infected.  I suspect her symptoms are from acute pyelonephritis.  Discussed case with OB/GYN who will start IV antibiotics and admit to the hospital for pyelonephritis in pregnancy.  Patient remained vitally stable while in the ER    I have reviewed the nursing notes. I have reviewed the findings, diagnosis, plan and need for follow up with the patient.    Current Discharge Medication List          Final diagnoses:   Pyelonephritis   Pregnancy test positive       Osiel Pandya  [facility] EMERGENCY DEPARTMENT  2/13/2025    --    ED Attending Physician Attestation    I Osiel Pandya MD, cared for this patient with the Resident. I have performed  a history and physical examination of the patient and discussed management with the resident. I reviewed the resident's documentation above and agree with the documented findings and plan of care.        Medical Decision Making  The patient's presentation was of high complexity (an acute health issue posing potential threat to life or bodily function).    The patient's evaluation involved:  review of 2 test result(s) ordered prior to this encounter (see separate area of note for details)  ordering and/or review of 3+ test(s) in this encounter (see separate area of note for details)  discussion of management or test interpretation with another health professional (discussed case with OB/GYN)    The patient's management necessitated high risk (a decision regarding hospitalization).      Osiel Pandya MD  Emergency Medicine        Osiel Pandya MD  02/15/25 0049

## 2025-02-13 NOTE — ED NOTES
Fetal heartbeat taken with doppler.  bpm.   Pt given Tylenol @1245. Writer asked pt about pain. Pt stated her pain is gone.

## 2025-02-13 NOTE — LETTER
Transition Communication Hand-off for Care Transitions to Next Level of Care Provider    Name: Sofia Rodriguez  : 1980  MRN #: [id number]  Primary Care Provider: Bianca Cobb     Primary Clinic: [address] [address]     Reason for Hospitalization:  Pyelonephritis [N12]  Pregnancy test positive [Z32.01]  Admit Date/Time: 2025 10:59 AM  Discharge Date: 2025  Payor Source: Payor: [facility] / Plan: [facility] CHOICE / Product Type: Indemnity /     Readmission Assessment Measure (HAYDER) Risk Score/category: 9%    Plan of Care Goals/Milestone Events:   Patient Concerns: E. Coli Bacteremia 2/2 Pyelonephritis  Hx of Latent Tuberculosis, Untreated  Pregnancy, 18w gestation   Patient Goals: return home, return to work.             Reason for Communication Hand-off Referral: Avoidable readmission within 30 days    Discharge Plan:  home     Concern for non-adherence with plan of care:   Y/N - No  Discharge Needs Assessment:  Needs      Flowsheet Row Most Recent Value   Equipment Currently Used at Home none            Already enrolled in Tele-monitoring program and name of program:  No  Follow-up specialty is recommended: Yes    Follow-up plan:    Future Appointments   Date Time Provider Department Center   3/5/2025  8:30 AM URFETR1 [facility]   3/10/2025  2:15 PM [id number] [facility]   3/10/2025  2:45 PM RH MFM MD [facility]       Any outstanding tests or procedures:              Key Recommendations:      JESUS Vila    AVS/Discharge Summary is the source of truth; this is a helpful guide for improved communication of patient story

## 2025-02-13 NOTE — LETTER
Transition Communication Hand-off for Care Transitions to Next Level of Care Provider    Name: Sofia Rodriguez  : 1980  MRN #: [id number]  Primary Care Provider: Bianca Cobb     Primary Clinic: [address] [address]     Reason for Hospitalization:  Pyelonephritis [N12]  Pregnancy test positive [Z32.01]  Admit Date/Time: 2025 10:59 AM  Discharge Date: 2025  Payor Source: Payor: [facility] / Plan: [facility] CHOICE / Product Type: Indemnity /            Reason for Communication Hand-off Referral: Avoidable readmission within 30 days    Discharge Plan: Discharge to home w/ home infusion for short-term IV abx course.    Discharge Needs Assessment:  Needs      Flowsheet Row Most Recent Value   Equipment Currently Used at Home none   # of Referrals Placed by CM Home Infusion          Follow-up plan:    Future Appointments   Date Time Provider Department Center   3/5/2025  8:30 AM URFETR1 [facility]   3/10/2025  2:15 PM [id number] [facility]   3/10/2025  2:45 PM RH MFM MD [facility]       Any outstanding tests or procedures:        Referrals       Future Labs/Procedures    Primary Care Referral     Comments:    Please be aware that coverage of these services is subject to the terms and limitations of your health insurance plan.  Call member services at your health plan with any benefit or coverage questions.    Home Infusion Referral               Key Recommendations:  Recommend PCP follow up appt.    Ike Keita RN Care Coordinator    AVS/Discharge Summary is the source of truth; this is a helpful guide for improved communication of patient story
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
## 2025-02-13 NOTE — ED TRIAGE NOTES
" utilized via [employer]  [id number]    Pt here for abdominal pain.    Pt fell down on Monday coming out of the car landing on her right side.  Did not feel any pain initially but yesterday afternoon, pt felt really \"hard pain on the right side rib area.\"  Currently right rib area pain is 9/10.    Pt does c/o mild lower abdominal pain 8/10, nausea, denies vaginal pain or bleeding.    Has not taken any pain medication.    Pt notes some burning with urination.  Denies diarrhea or constipation.    Pt is currently pregnant with 4th child.          "

## 2025-02-13 NOTE — LETTER
[facility] [address]  [address]  [place] [address]  Phone: [phone]  Fax: [phone]    February 16, 2025        Sofia Rodriguez  [address]   [place] [address]          To whom it may concern:    RE: Sofia Black has been in the hospital for treatment for her pregnancy. She was unable to attend work on Friday, 2/14. Please excuse her for this time.     Please contact me for questions or concerns.      Sincerely,      Iris Rice

## 2025-02-14 LAB
ACINETOBACTER SPECIES: NOT DETECTED
ALBUMIN SERPL BCG-MCNC: 3.2 G/DL (ref 3.5–5.2)
ALP SERPL-CCNC: 163 U/L (ref 40–150)
ALT SERPL W P-5'-P-CCNC: 22 U/L (ref 0–50)
ANION GAP SERPL CALCULATED.3IONS-SCNC: 12 MMOL/L (ref 7–15)
AST SERPL W P-5'-P-CCNC: 19 U/L (ref 0–45)
BACTERIA UR CULT: ABNORMAL
BILIRUB SERPL-MCNC: 0.6 MG/DL
BUN SERPL-MCNC: 8.6 MG/DL (ref 6–20)
CALCIUM SERPL-MCNC: 9.1 MG/DL (ref 8.8–10.4)
CHLORIDE SERPL-SCNC: 107 MMOL/L (ref 98–107)
CITROBACTER SPECIES: NOT DETECTED
CREAT SERPL-MCNC: 0.69 MG/DL (ref 0.51–0.95)
CTX-M: NOT DETECTED
EGFRCR SERPLBLD CKD-EPI 2021: >90 ML/MIN/1.73M2
ENTEROBACTER SPECIES: NOT DETECTED
ERYTHROCYTE [DISTWIDTH] IN BLOOD BY AUTOMATED COUNT: 14.9 % (ref 10–15)
ESCHERICHIA COLI: DETECTED
GLUCOSE SERPL-MCNC: 94 MG/DL (ref 70–99)
HCO3 SERPL-SCNC: 16 MMOL/L (ref 22–29)
HCT VFR BLD AUTO: 31.7 % (ref 35–47)
HGB BLD-MCNC: 10.8 G/DL (ref 11.7–15.7)
HOLD SPECIMEN: NORMAL
IMP: NOT DETECTED
KLEBSIELLA OXYTOCA: NOT DETECTED
KLEBSIELLA PNEUMONIAE: NOT DETECTED
KPC: NOT DETECTED
MCH RBC QN AUTO: 31.4 PG (ref 26.5–33)
MCHC RBC AUTO-ENTMCNC: 34.1 G/DL (ref 31.5–36.5)
MCV RBC AUTO: 92 FL (ref 78–100)
NDM: NOT DETECTED
OXA (DETECTED/NOT DETECTED): NOT DETECTED
PLATELET # BLD AUTO: 218 10E3/UL (ref 150–450)
POTASSIUM SERPL-SCNC: 3.2 MMOL/L (ref 3.4–5.3)
POTASSIUM SERPL-SCNC: 3.4 MMOL/L (ref 3.4–5.3)
PROT SERPL-MCNC: 7.1 G/DL (ref 6.4–8.3)
PROTEUS SPECIES: NOT DETECTED
PSEUDOMONAS AERUGINOSA: NOT DETECTED
RBC # BLD AUTO: 3.44 10E6/UL (ref 3.8–5.2)
SODIUM SERPL-SCNC: 135 MMOL/L (ref 135–145)
TSH SERPL DL<=0.005 MIU/L-ACNC: 2.27 UIU/ML (ref 0.3–4.2)
VIM: NOT DETECTED
WBC # BLD AUTO: 18.6 10E3/UL (ref 4–11)

## 2025-02-14 PROCEDURE — 250N000013 HC RX MED GY IP 250 OP 250 PS 637

## 2025-02-14 PROCEDURE — 250N000011 HC RX IP 250 OP 636

## 2025-02-14 PROCEDURE — 99223 1ST HOSP IP/OBS HIGH 75: CPT | Mod: GC | Performed by: INTERNAL MEDICINE

## 2025-02-14 PROCEDURE — 85027 COMPLETE CBC AUTOMATED: CPT

## 2025-02-14 PROCEDURE — 87040 BLOOD CULTURE FOR BACTERIA: CPT

## 2025-02-14 PROCEDURE — 99233 SBSQ HOSP IP/OBS HIGH 50: CPT | Performed by: STUDENT IN AN ORGANIZED HEALTH CARE EDUCATION/TRAINING PROGRAM

## 2025-02-14 PROCEDURE — 84132 ASSAY OF SERUM POTASSIUM: CPT

## 2025-02-14 PROCEDURE — 84295 ASSAY OF SERUM SODIUM: CPT

## 2025-02-14 PROCEDURE — 36415 COLL VENOUS BLD VENIPUNCTURE: CPT

## 2025-02-14 PROCEDURE — 120N000002 HC R&B MED SURG/OB UMMC

## 2025-02-14 PROCEDURE — 80053 COMPREHEN METABOLIC PANEL: CPT

## 2025-02-14 PROCEDURE — 84443 ASSAY THYROID STIM HORMONE: CPT

## 2025-02-14 RX ORDER — POTASSIUM CHLORIDE 750 MG/1
40 TABLET, EXTENDED RELEASE ORAL ONCE
Status: COMPLETED | OUTPATIENT
Start: 2025-02-14 | End: 2025-02-14

## 2025-02-14 RX ORDER — ONDANSETRON 4 MG/1
4 TABLET, ORALLY DISINTEGRATING ORAL EVERY 6 HOURS PRN
Status: DISCONTINUED | OUTPATIENT
Start: 2025-02-14 | End: 2025-02-16 | Stop reason: HOSPADM

## 2025-02-14 RX ORDER — OXYCODONE HYDROCHLORIDE 5 MG/1
5 TABLET ORAL EVERY 4 HOURS PRN
Status: DISCONTINUED | OUTPATIENT
Start: 2025-02-14 | End: 2025-02-16 | Stop reason: HOSPADM

## 2025-02-14 RX ORDER — AMOXICILLIN 250 MG
2 CAPSULE ORAL 2 TIMES DAILY PRN
Status: DISCONTINUED | OUTPATIENT
Start: 2025-02-14 | End: 2025-02-16 | Stop reason: HOSPADM

## 2025-02-14 RX ORDER — AMOXICILLIN 250 MG
2 CAPSULE ORAL 2 TIMES DAILY
Status: DISCONTINUED | OUTPATIENT
Start: 2025-02-14 | End: 2025-02-14

## 2025-02-14 RX ORDER — NALOXONE HYDROCHLORIDE 0.4 MG/ML
0.4 INJECTION, SOLUTION INTRAMUSCULAR; INTRAVENOUS; SUBCUTANEOUS
Status: DISCONTINUED | OUTPATIENT
Start: 2025-02-14 | End: 2025-02-16 | Stop reason: HOSPADM

## 2025-02-14 RX ORDER — NALOXONE HYDROCHLORIDE 0.4 MG/ML
0.2 INJECTION, SOLUTION INTRAMUSCULAR; INTRAVENOUS; SUBCUTANEOUS
Status: DISCONTINUED | OUTPATIENT
Start: 2025-02-14 | End: 2025-02-16 | Stop reason: HOSPADM

## 2025-02-14 RX ORDER — ACETAMINOPHEN 650 MG/1
650 SUPPOSITORY RECTAL EVERY 4 HOURS PRN
Status: DISCONTINUED | OUTPATIENT
Start: 2025-02-14 | End: 2025-02-16 | Stop reason: HOSPADM

## 2025-02-14 RX ORDER — PROCHLORPERAZINE MALEATE 5 MG/1
10 TABLET ORAL EVERY 6 HOURS PRN
Status: DISCONTINUED | OUTPATIENT
Start: 2025-02-14 | End: 2025-02-16 | Stop reason: HOSPADM

## 2025-02-14 RX ORDER — AMOXICILLIN 250 MG
1 CAPSULE ORAL 2 TIMES DAILY PRN
Status: DISCONTINUED | OUTPATIENT
Start: 2025-02-14 | End: 2025-02-16 | Stop reason: HOSPADM

## 2025-02-14 RX ORDER — CEFTRIAXONE 2 G/1
2 INJECTION, POWDER, FOR SOLUTION INTRAMUSCULAR; INTRAVENOUS EVERY 24 HOURS
Status: DISCONTINUED | OUTPATIENT
Start: 2025-02-14 | End: 2025-02-16 | Stop reason: HOSPADM

## 2025-02-14 RX ORDER — LIDOCAINE 40 MG/G
CREAM TOPICAL
Status: DISCONTINUED | OUTPATIENT
Start: 2025-02-14 | End: 2025-02-16 | Stop reason: HOSPADM

## 2025-02-14 RX ORDER — ONDANSETRON 2 MG/ML
4 INJECTION INTRAMUSCULAR; INTRAVENOUS EVERY 6 HOURS PRN
Status: DISCONTINUED | OUTPATIENT
Start: 2025-02-14 | End: 2025-02-16 | Stop reason: HOSPADM

## 2025-02-14 RX ORDER — PRENATAL VIT/IRON FUM/FOLIC AC 27MG-0.8MG
1 TABLET ORAL DAILY
Status: DISCONTINUED | OUTPATIENT
Start: 2025-02-14 | End: 2025-02-16 | Stop reason: HOSPADM

## 2025-02-14 RX ORDER — CALCIUM CARBONATE 500 MG/1
1000 TABLET, CHEWABLE ORAL 4 TIMES DAILY PRN
Status: DISCONTINUED | OUTPATIENT
Start: 2025-02-14 | End: 2025-02-16 | Stop reason: HOSPADM

## 2025-02-14 RX ORDER — ACETAMINOPHEN 325 MG/1
650 TABLET ORAL EVERY 4 HOURS PRN
Status: DISCONTINUED | OUTPATIENT
Start: 2025-02-14 | End: 2025-02-16 | Stop reason: HOSPADM

## 2025-02-14 RX ORDER — AMOXICILLIN 250 MG
1 CAPSULE ORAL 2 TIMES DAILY
Status: DISCONTINUED | OUTPATIENT
Start: 2025-02-14 | End: 2025-02-14

## 2025-02-14 RX ORDER — CEFTRIAXONE 1 G/1
1 INJECTION, POWDER, FOR SOLUTION INTRAMUSCULAR; INTRAVENOUS EVERY 24 HOURS
Status: DISCONTINUED | OUTPATIENT
Start: 2025-02-14 | End: 2025-02-14

## 2025-02-14 RX ADMIN — PRENATAL VIT W/ FE FUMARATE-FA TAB 27-0.8 MG 1 TABLET: 27-0.8 TAB at 08:22

## 2025-02-14 RX ADMIN — POTASSIUM CHLORIDE 40 MEQ: 750 TABLET, EXTENDED RELEASE ORAL at 08:22

## 2025-02-14 RX ADMIN — ACETAMINOPHEN 650 MG: 325 TABLET, FILM COATED ORAL at 07:11

## 2025-02-14 RX ADMIN — OXYCODONE 5 MG: 5 TABLET ORAL at 07:11

## 2025-02-14 RX ADMIN — ACETAMINOPHEN 650 MG: 325 TABLET, FILM COATED ORAL at 16:10

## 2025-02-14 RX ADMIN — OXYCODONE 5 MG: 5 TABLET ORAL at 15:02

## 2025-02-14 RX ADMIN — CEFTRIAXONE SODIUM 2 G: 2 INJECTION, POWDER, FOR SOLUTION INTRAMUSCULAR; INTRAVENOUS at 10:42

## 2025-02-14 ASSESSMENT — ACTIVITIES OF DAILY LIVING (ADL)
ADLS_ACUITY_SCORE: 27

## 2025-02-14 NOTE — ED NOTES
Called antepartum. Nurse stated that they spoke with OB doc and pt being admitted to a med surg unit, not antepartum. Pt 18 weeks pregnant.

## 2025-02-14 NOTE — CONSULTS
GENERAL ID SERVICE: NEW CONSULTATION    Patient:  Sofia Rodriguez, Date of birth 1980, Medical record number [id number]  Date of Admission: 2/13/2025  Date of Visit:  2/14/2025  Requesting Provider: Zohra Mcintyre         Assessment and Recommendations:     ID Problem List:  E. Coli Bacteremia 2/2 Pyelonephritis  Hx of Latent Tuberculosis, Untreated  Pregnancy, 18w gestation    Discussion:  43 yo female originally from [facility] who is 18 weeks pregnant and presented to the ED with right flank pain found to have E coli bacteremia thought to be secondary to pyelonephritis.    Blood cultures grew 1 of 2 bottles with gram negative rods, E. Coli detected without ESBL or carbapenem resistance based off of the Verigene panel. Urine cultures with > 100K E. Coli, urine analysis with nitrites, leuk esterase, and >180 WBCs. Right flank pain ongoing since recent fall on her right side. Ultra sound of kidneys with mild distention of right renal pelvis. Received 1g CTX on 2/13 PM. Recommend that we increase the dose to 2g Q24.    Recent positive quantioferon gold, originally from [facility]. CXR obtained on 2/13 without any cavitary lesions. No current respiratory symptoms. Consistent with latent tuberculosis. Would recommend she consider treatment after delivery as she has not immunosuppression besides being pregnant and no exposures. Prior HIV testing in 12/2024 was negative.    Recommendations:  Start Ceftriaxone 2g Q24 hrs for E. Coli bacteremia  No additional blood cultures needed unless clinically worsening  Latent tuberculosis treatment can be discussed after delivery or when breastfeeding has ceased  Can refer patient to primary care or [facility] TB clinic (She is a resident of [facility])    Recommendations discussed with OB/GYN team.    Thank you for this consult. ID team will continue to follow this patient. Please reach out if any questions or concerns.     Patient was seen and staffed with  Dr. Cliff Massey MD  Infectious Diseases Fellow, PGY-5  Pager: [phone]  Date: 2/14/2025       History of Present Illness:     Sofia Rodriguez is a 43 yo female who is 18 weeks pregnant presented to the ED with right flank pain and E. Coli bacteremia.    She fell on her right side a couple days ago, persistent right flank pain. Began having pain with urination, chills, and nausea since fall. Since here arrival she has received pain medications and antibiotics, so feeling better. Less nausea at this time.     Recent positive quantiferon gold test. She is originally from [facility] [place], [place]. No recent exposures and is not currently immunosuppressed. RPR and HIV previously non reactive in 12/2024 when found to be pregnant.     No tobacco, cannabis, or alcohol use. No injection drug use.         Review of Systems:     Complete 12 point review of systems negative except as noted in HPI.         Recent Antimicrobials::     Ceftriaxone 2/13 - ?       Past Medical History:   History reviewed. No pertinent past medical history.      Allergies:    No Known Allergies       Family History:   Reviewed and noncontributory.   History reviewed. No pertinent family history.       Social History:     Social History     Socioeconomic History    Marital status: Single     Spouse name: Not on file    Number of children: Not on file    Years of education: Not on file    Highest education level: Not on file   Occupational History    Not on file   Tobacco Use    Smoking status: Never    Smokeless tobacco: Never   Substance and Sexual Activity    Alcohol use: Never    Drug use: Never    Sexual activity: Never   Other Topics Concern    Not on file   Social History Narrative    Not on file     Social Drivers of Health     Financial Resource Strain: Not at Risk (1/22/2025)    Received from [facility]    Financial Resource Strain     Financial Resource Strain: 1   Food Insecurity: Not at Risk (1/22/2025)  "   Received from [facility]    Food Insecurity     Food: 1   Transportation Needs: Not at Risk (2025)    Received from [facility]    Transportation Needs     Transportation: 1   Physical Activity: Not on File (2019)    Received from [facility]    Physical Activity     Physical Activity: 0   Stress: Not on File (2019)    [facility] from [facility]    Stress     Stress: 0   Social Connections: Not on File (2024)    Received from [facility]    Social Connections     Connectedness: 0   Interpersonal Safety: Not on file   Housing Stability: Not at Risk (2025)    Received from [facility]    Housing Stability     Housin            Physical Exam:     BP 96/67   Pulse 92   Temp 98.9  F (37.2  C)   Resp 18   Ht 1.626 m (5' 4.02\")   Wt 60.8 kg (134 lb)   LMP 10/03/2024   SpO2 97%   BMI 22.99 kg/m       Exam:  GENERAL:  Well-developed, well-nourished, sitting in bed in no acute distress.   Head: normocephalic, atraumatic.   EYES: PERRLA, EOMI, conjunctiva clear, anicteric sclerae.    ENT:  Oropharynx is moist without exudates or ulcers.  NECK:  Supple.  LUNGS:  Breathing comfortably, on room air, clear to auscultation bilaterally, no w/r/r.  CARDIOVASCULAR:  Regular rate and rhythm, +S1S2 with no murmurs, gallops or rubs.  ABDOMEN:  Normal bowel sounds, soft, nontender. No appreciable hepatosplenomegaly. Gravid.  : Right flank TTP and CVA TTP. No erythema or ecchymoses.   EXT: Extremities warm and without edema.  SKIN:  No acute rashes.    NEUROLOGIC:  Grossly nonfocal.          Laboratory Data:   Metabolic Studies       Recent Labs   Lab Test 25  0701 25  1142    135   POTASSIUM 3.2* 3.2*   CHLORIDE 107 104   CO2 16* 19*   ANIONGAP 12 12   BUN 8.6 9.4   CR 0.69 0.72   GFRESTIMATED >90 >90   GLC 94 100*   STEVE 9.1 8.9       Hepatic Studies    Recent Labs   Lab Test 25  0701 25  1142 08/15/23  1054   BILITOTAL 0.6 0.8 0.4   DBIL  --   --  <0.20   ALKPHOS 163* 165* 131* " "  PROTTOTAL 7.1 7.2 8.4*   ALBUMIN 3.2* 3.4* 4.3   AST 19 22 28   ALT 22 24 23       Hematology Studies      Recent Labs   Lab Test 02/14/25  0701 02/13/25  1142 08/15/23  1054   WBC 18.6* 15.4* 3.9*   HGB 10.8* 10.8* 11.0*   HCT 31.7* 31.3* 34.8*    242 321       Medication levels  No lab results found.    Invalid input(s): \"AMIK\"    Inflammatory Markers  No lab results found.    Invalid input(s): \"RATE\", \"AUTO\", \"ESR\", \"WESR\"    Body fluid stats  No lab results found.    Microbiology:  Last Culture results with specimen source  No results found for: \"CULT\" No results found for: \"SDES\"     Last check of C difficile  No results found for: \"CDBPCT\"    Urine Studies     Recent Labs   Lab Test 02/13/25  1601   URINEPH 6.5   NITRITE Positive*   LEUKEST Large*   WBCU >182*       CSF testing   No lab results found.    Invalid input(s): \"CADAM\", \"EVPCR\", \"ENTPCR\", \"ENTEROVIRUS\"    Imaging:  Recent Results (from the past 48 hours)   US Renal Complete Non-Vascular    Narrative    EXAM: US RENAL COMPLETE NON-VASCULAR  LOCATION: [facility]  DATE: 2/13/2025    INDICATION: Right flank and suprapubic, renal ultrasound 8/31/2022 pain. Fall 4 days ago. Pregnant.  COMPARISON: Renal MR 9/26/2022  TECHNIQUE: Routine Bilateral Renal and Bladder Ultrasound.    FINDINGS:    RIGHT KIDNEY: 12.3 x 5.3 x 4.9 cm. Mildly distended right renal pelvis. No calculi or masses.     LEFT KIDNEY: 11.4 x 6.1 x 4.8 cm. Normal without hydronephrosis or masses.     BLADDER: Normal.     A poorly defined elliptical structure is measured along the right side of the pelvis at 1.8 cm and noted on the worksheet as a possible distended ureter. On the trans and long cine loops taken through the this actually  reflects a small  amount of   ascites in the pelvis, measuring up to 2 cm in AP dimension    Color overlay along the bladder base demonstrates bilateral ureteral jets.     Intrauterine gestation noted but " not assessed.      Impression    IMPRESSION:  1.  Mild distention of the right renal pelvis, clearly progressed since 2022 without evidence of calculi.  2.  Small amount of ascites in the pelvis measuring 2 cm in AP diameter.  3.  Color overlay demonstrates bilateral ureteral jets without any calculi by ultrasound.  4.  Depending on patient's symptoms, consider further evaluation with OB/pelvic US or MRI of the abdomen.   XR Chest 2 Views    Narrative    EXAM: XR CHEST 2 VIEWS  LOCATION: [facility]  DATE: 2/13/2025    INDICATION: Pain. r o rib fracture  COMPARISON: None.      Impression    IMPRESSION: Negative chest. Nothing concerning for rib fracture of the chest x-ray.

## 2025-02-14 NOTE — MEDICATION SCRIBE - ADMISSION MEDICATION HISTORY
Medication Scribe Admission Medication History    Admission medication history is complete. The information provided in this note is only as accurate as the sources available at the time of the update.    Information Source(s): Patient and CareEverywhere/SureScripts via in-person    Pertinent Information: Patient reported she ran out of her levothyroxine a little over a month ago.    Changes made to PTA medication list:  Added: aspirin 81, levothyroxine 50mcg, prenatals, vitamin d3  Deleted: None  Changed: None    Allergies reviewed with patient and updates made in EHR: yes    Medication History Completed By: Lolita Rowell 2/13/2025 6:32 PM    PTA Med List   Medication Sig Last Dose/Taking    aspirin 81 MG EC tablet Take 1 tablet by mouth daily. 2/12/2025    levothyroxine (SYNTHROID/LEVOTHROID) 50 MCG tablet Take 50 mcg by mouth every morning (before breakfast). More than a month    Prenatal Vit-Fe Fumarate-FA (PRENATAL MULTIVITAMIN W/IRON) 27-0.8 MG tablet Take 1 tablet by mouth daily. 2/12/2025    vitamin D3 (CHOLECALCIFEROL) 125 MCG (5000 UT) tablet Take 1 tablet by mouth daily. 2/12/2025
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
## 2025-02-14 NOTE — PLAN OF CARE
8 Med/Surg Admission Note    Reason for admission: Pyelonephritis  Primary team notified of pt arrival.  [place] [facility]  Via: ***  Accompanied by: Self  Belongings: Placed in closet; valuables sent home with family  Admission Required Doc Completed: Yes/No  Mobility Devices Utilized by Patient Provided (i.e. walker, wheelchair, etc.): NA  Teaching: Orientation to unit and call light- call light within reach, use of console, meal times, when to call for the RN, and enforced importance of safety.  IV Access: Yes  Telemetry: No  Ht./Wt.: Completed  Code Status verified on armband: No. Code status has not been updated on EPIC  2 RN Skin Assessment Completed with: ***  Suction/Ambu bag/Flowmeter at bedside: Yes/No    Pt status:  Temp:  [98.5  F (36.9  C)-98.7  F (37.1  C)] 98.5  F (36.9  C)  Pulse:  [89-94] 94  Resp:  [16-18] 18  BP: (106-109)/(66-79) 109/68  SpO2:  [99 %] 99 %

## 2025-02-14 NOTE — PLAN OF CARE
"Goal Outcome Evaluation:      VS: BP 96/67   Pulse 92   Temp 98.9  F (37.2  C)   Resp 18   Ht 1.626 m (5' 4.02\")   Wt 60.8 kg (134 lb)   LMP 10/03/2024   SpO2 97%   BMI 22.99 kg/m       O2: Stable on room air >90%   Output: Voids without difficulties   Last BM: 2/13/25   Activity: Independent   Up for meals? Yes   Skin: Intact   Pain: Reports pain at 8/10 managed with PRN oxy   CMS: AO x4   Dressing: None   Diet: Regular   LDA: Left PIV SL   Equipment: None   Plan: Continue with POC   Additional Info: Replaced K this shift  On IV Abx q24         "

## 2025-02-15 LAB
ANION GAP SERPL CALCULATED.3IONS-SCNC: 14 MMOL/L (ref 7–15)
BUN SERPL-MCNC: 10.4 MG/DL (ref 6–20)
CALCIUM SERPL-MCNC: 9.1 MG/DL (ref 8.8–10.4)
CHLORIDE SERPL-SCNC: 104 MMOL/L (ref 98–107)
CREAT SERPL-MCNC: 0.66 MG/DL (ref 0.51–0.95)
EGFRCR SERPLBLD CKD-EPI 2021: >90 ML/MIN/1.73M2
ERYTHROCYTE [DISTWIDTH] IN BLOOD BY AUTOMATED COUNT: 14.7 % (ref 10–15)
GLUCOSE SERPL-MCNC: 79 MG/DL (ref 70–99)
HCO3 SERPL-SCNC: 17 MMOL/L (ref 22–29)
HCT VFR BLD AUTO: 30.2 % (ref 35–47)
HGB BLD-MCNC: 10.2 G/DL (ref 11.7–15.7)
MCH RBC QN AUTO: 31.3 PG (ref 26.5–33)
MCHC RBC AUTO-ENTMCNC: 33.8 G/DL (ref 31.5–36.5)
MCV RBC AUTO: 93 FL (ref 78–100)
PLATELET # BLD AUTO: 220 10E3/UL (ref 150–450)
POTASSIUM SERPL-SCNC: 3.4 MMOL/L (ref 3.4–5.3)
POTASSIUM SERPL-SCNC: 3.8 MMOL/L (ref 3.4–5.3)
RBC # BLD AUTO: 3.26 10E6/UL (ref 3.8–5.2)
SODIUM SERPL-SCNC: 135 MMOL/L (ref 135–145)
WBC # BLD AUTO: 12.4 10E3/UL (ref 4–11)

## 2025-02-15 PROCEDURE — 120N000002 HC R&B MED SURG/OB UMMC

## 2025-02-15 PROCEDURE — 80048 BASIC METABOLIC PNL TOTAL CA: CPT

## 2025-02-15 PROCEDURE — 250N000013 HC RX MED GY IP 250 OP 250 PS 637

## 2025-02-15 PROCEDURE — 99232 SBSQ HOSP IP/OBS MODERATE 35: CPT | Performed by: INTERNAL MEDICINE

## 2025-02-15 PROCEDURE — 84132 ASSAY OF SERUM POTASSIUM: CPT

## 2025-02-15 PROCEDURE — 82565 ASSAY OF CREATININE: CPT

## 2025-02-15 PROCEDURE — 250N000011 HC RX IP 250 OP 636

## 2025-02-15 PROCEDURE — 85018 HEMOGLOBIN: CPT

## 2025-02-15 PROCEDURE — 250N000013 HC RX MED GY IP 250 OP 250 PS 637: Performed by: STUDENT IN AN ORGANIZED HEALTH CARE EDUCATION/TRAINING PROGRAM

## 2025-02-15 PROCEDURE — 36415 COLL VENOUS BLD VENIPUNCTURE: CPT

## 2025-02-15 PROCEDURE — 85041 AUTOMATED RBC COUNT: CPT

## 2025-02-15 RX ORDER — POTASSIUM CHLORIDE 750 MG/1
40 TABLET, EXTENDED RELEASE ORAL ONCE
Status: COMPLETED | OUTPATIENT
Start: 2025-02-15 | End: 2025-02-15

## 2025-02-15 RX ORDER — CYCLOBENZAPRINE HCL 10 MG
10 TABLET ORAL 3 TIMES DAILY PRN
Status: DISCONTINUED | OUTPATIENT
Start: 2025-12-25 | End: 2025-02-16 | Stop reason: HOSPADM

## 2025-02-15 RX ORDER — CYCLOBENZAPRINE HCL 10 MG
10 TABLET ORAL ONCE
Status: COMPLETED | OUTPATIENT
Start: 2025-02-15 | End: 2025-02-15

## 2025-02-15 RX ADMIN — PRENATAL VIT W/ FE FUMARATE-FA TAB 27-0.8 MG 1 TABLET: 27-0.8 TAB at 07:52

## 2025-02-15 RX ADMIN — OXYCODONE 5 MG: 5 TABLET ORAL at 07:52

## 2025-02-15 RX ADMIN — OXYCODONE 5 MG: 5 TABLET ORAL at 16:46

## 2025-02-15 RX ADMIN — ACETAMINOPHEN 650 MG: 325 TABLET, FILM COATED ORAL at 00:09

## 2025-02-15 RX ADMIN — POTASSIUM CHLORIDE 40 MEQ: 750 TABLET, EXTENDED RELEASE ORAL at 10:23

## 2025-02-15 RX ADMIN — CYCLOBENZAPRINE 10 MG: 10 TABLET, FILM COATED ORAL at 10:23

## 2025-02-15 RX ADMIN — CEFTRIAXONE SODIUM 2 G: 2 INJECTION, POWDER, FOR SOLUTION INTRAMUSCULAR; INTRAVENOUS at 10:23

## 2025-02-15 RX ADMIN — SENNOSIDES AND DOCUSATE SODIUM 2 TABLET: 50; 8.6 TABLET ORAL at 20:05

## 2025-02-15 ASSESSMENT — ACTIVITIES OF DAILY LIVING (ADL)
ADLS_ACUITY_SCORE: 15
DEPENDENT_IADLS:: INDEPENDENT
ADLS_ACUITY_SCORE: 15
ADLS_ACUITY_SCORE: 27
ADLS_ACUITY_SCORE: 15
ADLS_ACUITY_SCORE: 27
ADLS_ACUITY_SCORE: 27
ADLS_ACUITY_SCORE: 15
ADLS_ACUITY_SCORE: 15
ADLS_ACUITY_SCORE: 27
ADLS_ACUITY_SCORE: 27
ADLS_ACUITY_SCORE: 15
ADLS_ACUITY_SCORE: 27
ADLS_ACUITY_SCORE: 15
ADLS_ACUITY_SCORE: 15
ADLS_ACUITY_SCORE: 27
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
## 2025-02-15 NOTE — PLAN OF CARE
Goal Outcome Evaluation:      Plan of Care Reviewed With: patient    Overall Patient Progress: no changeOverall Patient Progress: no change    Shift 9100-5039  No acute events overnight. Pt is AxOx4, denies sob, cp, denies cough. Denies N/v. Right abd and flank pain rated 7/10, tolerated with prn tylenol. Voiding without difficulty or pain. No new medical concern. POC ongoing.  Temp: 98  F (36.7  C) Temp src: Oral BP: 91/53 Pulse: 89   Resp: 18 SpO2: 99 % O2 Device: None (Room air)

## 2025-02-15 NOTE — PLAN OF CARE
Goal Outcome Evaluation:      Plan of Care Reviewed With: patient    Overall Patient Progress: no changeOverall Patient Progress: no change     Pt anticipates discharge home once medically ready.

## 2025-02-15 NOTE — PROGRESS NOTES
[facility] GENERAL INFECTIOUS DISEASES PROGRESS NOTE     Patient:  Sofia Rodriguez   YOB: 1980, MRN: [id number]  Date of Visit: 02/15/2025  Date of Admission: 2/13/2025  Consult Requester: Zohra Mcintyre MD          Assessment and Recommendations:   ID Problem List:  E. Coli Bacteremia 2/2 Pyelonephritis  Hx of Latent Tuberculosis, Untreated  Pregnancy, 18w gestation    43 yo female originally from [facility] who is 18 weeks pregnant and presented to the ED with right flank pain found to have E coli bacteremia thought to be secondary to pyelonephritis.     Blood cultures with pan-susceptible E coli, as well as in urine culture. With ongoing R flank pain and mild distention of R renal pelvis, agree that pyelonephritis is the likely etiology. She is clinically improving on ceftriaxone. I would prefer this is continued for 7-10 days via IV (either at an infusion center daily vs home - pending pts insurance coverage) due to tolerance, safety profile, and known efficacy. A potential oral option would be bactrim, but there is less clinical evidence with this than IV beta-lactams. As she is in the second trimester this should be ok from a pregnancy standpoint, but will defer to OB team.      Recent positive quantioferon gold, originally from [facility]. CXR obtained on 2/13 without any cavitary lesions. No current respiratory symptoms. Consistent with latent tuberculosis. Would recommend she consider treatment after delivery as she has not immunosuppression besides being pregnant and no exposures. Prior HIV testing in 12/2024 was negative.    Recommendations:  Continue IV ceftriaxone 2g qday. Recommend 7-10 days of antibiotic therapy for both pyelonephritis and bacteremia  PO bactrim 1 DS tab BID would be an acceptable alternative.   Recommend involving care coordinators to look into IV antibiotic coverage (daily infusion center vs home)  Latent tuberculosis treatment can be discussed after delivery  or when breastfeeding has ceased  Can refer patient to primary care or [facility] TB clinic (She is a resident of [facility])    Primary team paged with recommendations.     Thank you for the consult. ID will continue to follow with you.    Batsheva Washburn MD   Infectious Diseases  Pager: [phone]  02/15/2025         Interval History and Events:   - Tmax 100.5 in the last 24H. Otherwise HDS.   - Leukocytosis improving.   - Blood and urine cultures both with pan-susceptible E coli.   - Pt reports overall improvement, but continues to feel fatigued, R flank pain. No pain/burning with urinating, no abdominal pain. No side effects from the ceftriaxone.          Relevant Microbiology:   2/13/25 Urine culture: >100,000 CFU E coli  Susceptibility     Escherichia coli     SOFIYA     Ampicillin <=2 ug/mL Susceptible     Ampicillin/ Sulbactam <=2 ug/mL Susceptible     Cefazolin <=1 ug/mL Susceptible     Cefepime <=0.12 ug/mL Susceptible     Ceftazidime <=0.5 ug/mL Susceptible     Ceftriaxone <=0.25 ug/mL Susceptible     Ciprofloxacin <=0.06 ug/mL Susceptible     Ertapenem <=0.12 ug/mL Susceptible *     Extended Spectrum Beta-Lactamase Negative ug/mL ESBL Negative *     Gentamicin <=1 ug/mL Susceptible     Levofloxacin <=0.12 ug/mL Susceptible     Meropenem <=0.25 ug/mL Susceptible *     Nitrofurantoin <=16 ug/mL Susceptible     Piperacillin/Tazobactam <=4 ug/mL Susceptible     Trimethoprim/Sulfamethoxazole <=1/19 ug/mL Susceptible      2/13/25 Blood culture X1: E coli    Susceptibility     Escherichia coli     SOFIYA     Ampicillin <=2 ug/mL Susceptible     Ampicillin/ Sulbactam <=2 ug/mL Susceptible     Cefazolin <=1 ug/mL Susceptible *     Cefepime <=0.12 ug/mL Susceptible     Ceftazidime <=0.5 ug/mL Susceptible     Ceftriaxone <=0.25 ug/mL Susceptible     Ciprofloxacin <=0.06 ug/mL Susceptible     Ertapenem <=0.12 ug/mL Susceptible *     Extended Spectrum Beta-Lactamase Negative ug/mL ESBL Negative *     Gentamicin <=1  ug/mL Susceptible     Levofloxacin <=0.12 ug/mL Susceptible     Meropenem <=0.25 ug/mL Susceptible     Nitrofurantoin <=16 ug/mL Susceptible *     Piperacillin/Tazobactam <=4 ug/mL Susceptible     Trimethoprim/Sulfamethoxazole <=1/19 ug/mL Susceptible            2/14/25 Blood culture X2: NGTD         Antimicrobial Treatment:   Ceftriaxone 2/13 - present          Review of Systems:   Targeted 4 point ROS was completed with pertinent positives and negatives are detailed above.         Physical Examination:   Temp:  [98  F (36.7  C)-100.5  F (38.1  C)] 98.7  F (37.1  C)  Pulse:  [75-92] 91  Resp:  [16-18] 16  BP: ()/(49-65) 101/49  SpO2:  [97 %-99 %] 97 %    Constitutional: Pleasant and cooperative female seen lying in bed, in NAD. Awake, alert, interactive.  HEENT: EOMI, sclera clear, conjunctiva normal, OP with MMM  Respiratory: No increased work of breathing, CTAB, no crackles or wheezing.  Cardiovascular: RRR, no murmur noted. No peripheral edema.  GI: Soft, non-distended and non-tender.  Back: TTP over R flank  Skin: Warm, dry, well-perfused. No bruising, bleeding, rashes, or lesions on limited exam.  Musculoskeletal: Extremities grossly normal, non-tender, no edema. Good strength and ROM in bed.   Neurologic: A&O. Answers questions appropriately, speech normal. Moves all extremities spontaneously.  Neuropsychiatric: Calm. Affect appropriate to situation.         Medications:     Current Facility-Administered Medications   Medication Dose Route Frequency Provider Last Rate Last Admin    cefTRIAXone (ROCEPHIN) 2 g vial to attach to  ml bag for ADULTS or NS 50 ml bag for PEDS  2 g Intravenous Q24H Xander Garcia MD   2 g at 02/14/25 1042    prenatal multivitamin w/iron per tablet 1 tablet  1 tablet Oral Daily Xander Garcia MD   1 tablet at 02/15/25 0752    sodium chloride (PF) 0.9% PF flush 3 mL  3 mL Intracatheter Q8H Xander Garcia MD   3 mL at 02/15/25 0752       Antiinfectives:  Anti-infectives (From now,  onward)      Start     Dose/Rate Route Frequency Ordered Stop    02/14/25 1014  cefTRIAXone (ROCEPHIN) 2 g vial to attach to  ml bag for ADULTS or NS 50 ml bag for PEDS         2 g  over 30 Minutes Intravenous EVERY 24 HOURS 02/14/25 1014                 Laboratory Data:   Metabolic Studies     Recent Labs   Lab Test 02/15/25  0715 02/14/25  1301 02/14/25  0701 02/13/25  1142     --  135 135   POTASSIUM 3.4 3.4 3.2* 3.2*   CHLORIDE 104  --  107 104   CO2 17*  --  16* 19*   ANIONGAP 14  --  12 12   BUN 10.4  --  8.6 9.4   CR 0.66  --  0.69 0.72   GFRESTIMATED >90  --  >90 >90   GLC 79  --  94 100*   STEVE 9.1  --  9.1 8.9     Hepatic Studies    Recent Labs   Lab Test 02/14/25  0701 02/13/25  1142 08/15/23  1054   BILITOTAL 0.6 0.8 0.4   ALKPHOS 163* 165* 131*   ALBUMIN 3.2* 3.4* 4.3   AST 19 22 28   ALT 22 24 23     Hematology Studies      Recent Labs   Lab Test 02/15/25  0715 02/14/25  0701 02/13/25  1142   WBC 12.4* 18.6* 15.4*   HGB 10.2* 10.8* 10.8*   HCT 30.2* 31.7* 31.3*    218 242     Urine Studies     Recent Labs   Lab Test 02/13/25  1601   URINEPH 6.5   NITRITE Positive*   LEUKEST Large*   WBCU >182*          Imaging:   No new.

## 2025-02-15 NOTE — PLAN OF CARE
"Goal Outcome Evaluation:      VS: /49 (BP Location: Right arm, Patient Position: Sitting, Cuff Size: Adult Regular)   Pulse 91   Temp 98.7  F (37.1  C) (Oral)   Resp 16   Ht 1.626 m (5' 4.02\")   Wt 60.8 kg (134 lb)   LMP 10/03/2024   SpO2 97%   BMI 22.99 kg/m       O2: Stable ORA   Output: Voids spontaneously    Last BM: 2/13/25   Activity: SBA   Up for meals? Yes   Skin: Intact   Pain: Right abdominal pain managed with PRN oxy   CMS: AO x4   Dressing: None   Diet: Regular   LDA: Left PIV SL   Equipment: None   Plan: Continue with POC   Additional Info: K replaced this shift  Continue IV Abx       "
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
## 2025-02-16 ENCOUNTER — HOME INFUSION (OUTPATIENT)
Dept: HOME HEALTH SERVICES | Facility: HOME HEALTH | Age: 45
End: 2025-02-16
Payer: COMMERCIAL

## 2025-02-16 ENCOUNTER — APPOINTMENT (OUTPATIENT)
Dept: INTERPRETER SERVICES | Facility: CLINIC | Age: 45
DRG: 832 | End: 2025-02-16
Payer: COMMERCIAL

## 2025-02-16 ENCOUNTER — ENROLLMENT (OUTPATIENT)
Dept: HOME HEALTH SERVICES | Facility: HOME HEALTH | Age: 45
End: 2025-02-16
Payer: COMMERCIAL

## 2025-02-16 VITALS
HEART RATE: 87 BPM | DIASTOLIC BLOOD PRESSURE: 59 MMHG | BODY MASS INDEX: 22.88 KG/M2 | SYSTOLIC BLOOD PRESSURE: 92 MMHG | RESPIRATION RATE: 18 BRPM | WEIGHT: 134 LBS | HEIGHT: 64 IN | OXYGEN SATURATION: 97 % | TEMPERATURE: 98.6 F

## 2025-02-16 DIAGNOSIS — N12 PYELONEPHRITIS: Primary | ICD-10-CM

## 2025-02-16 LAB
BACTERIA BLD CULT: ABNORMAL
BACTERIA BLD CULT: ABNORMAL
ERYTHROCYTE [DISTWIDTH] IN BLOOD BY AUTOMATED COUNT: 14.4 % (ref 10–15)
HCT VFR BLD AUTO: 31.4 % (ref 35–47)
HGB BLD-MCNC: 10.7 G/DL (ref 11.7–15.7)
MCH RBC QN AUTO: 31.4 PG (ref 26.5–33)
MCHC RBC AUTO-ENTMCNC: 34.1 G/DL (ref 31.5–36.5)
MCV RBC AUTO: 92 FL (ref 78–100)
PLATELET # BLD AUTO: 265 10E3/UL (ref 150–450)
POTASSIUM SERPL-SCNC: 3.5 MMOL/L (ref 3.4–5.3)
RBC # BLD AUTO: 3.41 10E6/UL (ref 3.8–5.2)
WBC # BLD AUTO: 5.6 10E3/UL (ref 4–11)

## 2025-02-16 PROCEDURE — 36569 INSJ PICC 5 YR+ W/O IMAGING: CPT

## 2025-02-16 PROCEDURE — 999N000040 HC STATISTIC CONSULT NO CHARGE VASC ACCESS

## 2025-02-16 PROCEDURE — 272N000276 HC DEVICE 3FR SECURACATH

## 2025-02-16 PROCEDURE — 84132 ASSAY OF SERUM POTASSIUM: CPT | Performed by: STUDENT IN AN ORGANIZED HEALTH CARE EDUCATION/TRAINING PROGRAM

## 2025-02-16 PROCEDURE — T1013 SIGN LANG/ORAL INTERPRETER: HCPCS | Mod: U4,TEL,95

## 2025-02-16 PROCEDURE — 85014 HEMATOCRIT: CPT

## 2025-02-16 PROCEDURE — 272N000454 HC KIT, 3FR SV SINGLE LUMEN POWERPICC

## 2025-02-16 PROCEDURE — 250N000009 HC RX 250

## 2025-02-16 PROCEDURE — 250N000011 HC RX IP 250 OP 636

## 2025-02-16 PROCEDURE — 99232 SBSQ HOSP IP/OBS MODERATE 35: CPT | Performed by: INTERNAL MEDICINE

## 2025-02-16 PROCEDURE — 36415 COLL VENOUS BLD VENIPUNCTURE: CPT | Performed by: STUDENT IN AN ORGANIZED HEALTH CARE EDUCATION/TRAINING PROGRAM

## 2025-02-16 PROCEDURE — 85041 AUTOMATED RBC COUNT: CPT

## 2025-02-16 PROCEDURE — 250N000013 HC RX MED GY IP 250 OP 250 PS 637

## 2025-02-16 RX ORDER — HEPARIN SODIUM,PORCINE 10 UNIT/ML
5-20 VIAL (ML) INTRAVENOUS EVERY 24 HOURS
Status: DISCONTINUED | OUTPATIENT
Start: 2025-02-16 | End: 2025-02-16 | Stop reason: HOSPADM

## 2025-02-16 RX ORDER — NITROFURANTOIN 25; 75 MG/1; MG/1
100 CAPSULE ORAL DAILY
Qty: 90 CAPSULE | Refills: 1 | Status: SHIPPED | OUTPATIENT
Start: 2025-02-16 | End: 2025-08-15

## 2025-02-16 RX ORDER — HEPARIN SODIUM,PORCINE 10 UNIT/ML
5-20 VIAL (ML) INTRAVENOUS
Status: DISCONTINUED | OUTPATIENT
Start: 2025-02-16 | End: 2025-02-16 | Stop reason: HOSPADM

## 2025-02-16 RX ORDER — LIDOCAINE 40 MG/G
CREAM TOPICAL
Status: DISCONTINUED | OUTPATIENT
Start: 2025-02-16 | End: 2025-02-16 | Stop reason: HOSPADM

## 2025-02-16 RX ADMIN — LIDOCAINE HYDROCHLORIDE ANHYDROUS 2 ML: 10 INJECTION, SOLUTION INFILTRATION at 15:26

## 2025-02-16 RX ADMIN — PRENATAL VIT W/ FE FUMARATE-FA TAB 27-0.8 MG 1 TABLET: 27-0.8 TAB at 07:54

## 2025-02-16 RX ADMIN — CEFTRIAXONE SODIUM 2 G: 2 INJECTION, POWDER, FOR SOLUTION INTRAMUSCULAR; INTRAVENOUS at 10:51

## 2025-02-16 ASSESSMENT — ACTIVITIES OF DAILY LIVING (ADL)
ADLS_ACUITY_SCORE: 15
ADLS_ACUITY_SCORE: 15
ADLS_ACUITY_SCORE: 17
ADLS_ACUITY_SCORE: 17
ADLS_ACUITY_SCORE: 15
ADLS_ACUITY_SCORE: 17
ADLS_ACUITY_SCORE: 15
ADLS_ACUITY_SCORE: 15
ADLS_ACUITY_SCORE: 17
ADLS_ACUITY_SCORE: 17
ADLS_ACUITY_SCORE: 15

## 2025-02-16 NOTE — PLAN OF CARE
"Goal Outcome Evaluation:      VS: BP 92/70 (BP Location: Left arm)   Pulse 91   Temp 98.5  F (36.9  C) (Oral)   Resp 18   Ht 1.626 m (5' 4.02\")   Wt 60.8 kg (134 lb)   LMP 10/03/2024   SpO2 97%   BMI 22.99 kg/m       O2: Stable ORA   Output: Voids without difficulties   Last BM: No BM this shift   Activity: Independent   Up for meals? Yes   Skin: Intact   Pain: Reports pain at 7/10   CMS: AO x4   Dressing: None   Diet: Regular   LDA: Left PIV SL   Equipment: None   Plan: Continue with POC   Additional Info: Discharge today after PICC line for home infusion      8MS DISCHARGE    D: Patient discharged home at 1725. Patient accompanied by Brother.    I: Discharge prescriptions given to patient. All discharge medications and instructions reviewed with patient via  services. Patient instructed to seek care if experiencing worsening symptoms, such as abdominal pain. Other phone numbers to call with questions or concerns after discharge reviewed. Left PIV removed. Education completed.    A: Pt verbalized understanding of discharge medications and instructions. Prescribed home medications given to patient.  Belongings returned to patient.    P: Patient to follow-up on 5th of March for fetal monitoring.             "

## 2025-02-16 NOTE — CONSULTS
Care Management Discharge Note    Discharge Date: 02/16/2025       Discharge Disposition: Home Infusion    Discharge Services:  ([facility] for short-term IV abx course)    Discharge DME:  (No new DME anticipated)    Discharge Transportation: family or friend will provide    Private pay costs discussed: Not applicable    Does the patient's insurance plan have a 3 day qualifying hospital stay waiver?  Yes     Which insurance plan 3 day waiver is available? Alternative insurance waiver    Will the waiver be used for post-acute placement? No    PAS Confirmation Code: N/A  Patient/family educated on Medicare website which has current facility and service quality ratings: no    Education Provided on the Discharge Plan: Yes  Persons Notified of Discharge Plans: pt/family, provider, [facility], bedside RN  Patient/Family in Agreement with the Plan: yes    Handoff Referral Completed: Yes, non-[facility] PCP: External handoff communication completed    Additional Information:  Consult received to investigate home vs outpatient infusion.    Followed up w/ PGY 3 OB provider regarding arranging infusion.  Informed provider that per SW note, pt works nearly every day of the week doing 2 different jobs, so this writer anticipates pt may be inclined to opt for home infusion, given it would offer more flexibility than OP infusion appts.  Home infusion would require a type of venous access line.  Provider will await update on type of line required.    Placed home infusion investigation referral for [facility] to check benefits.    Followed up w/ [facility] regarding type of line required - per weekend contact, Rani Jackson, a PICC is needed for this pt's IV abx plan.    Updated provider that PICC is required if pursuing home infusion.    10:45am - Met w/ pt at bedside, she had other family members present, but was agreeable to talking.  Pt's dtr was present, is fluent in both English and Malaysian, so pt  opted to utilize her to help facilitate discussion.  Confirmed home address and PCP.  Home address presently inaccurate in Epic.  Informed pt/family of home infusion vs OP infusion options.  Explained OP infusion, highlighting that it is not possible to guarantee specific OP infusion times, as the schedules are usually quite full.  Explained home infusion, highlighting that pt would need PICC placement and that pt/family would be taught how to admin IV abx.  Explained that for home infusion, pt/family would be independently responsible for the admin of the med (no assist from healthcare personnel).  Ultimately, pt opted to pursue home infusion.  Pt inquired when she may discharge, this writer explained that given it is Sunday around 11:00am, it is unlikely everything will fall into place today, so she is more likely to discharge tomorrow.  Pt expressed understanding.  The only additional request was a note from provider excusing pt from work for tomorrow.  No further questions/concerns for this writer at this time.    11:00am - Updated provider to pt's decision to pursue home infusion and request for note excusing pt from work.  Provider acknowledged, will enter vascular access consult for PICC placement.    [facility] documentation indicates pt has coverage for home infusion.    Updated home address in [facility].    Requested provider to place home infusion referral order.    Spoke w/ Rani from [facility] over the phone, discussed discharge.  Pt can discharge today if PICC is placed and pt receives daily IV abx dose, as [facility] can coordinate delivery of medications and in-home teaching for tomorrow.  [facility] will supply nursing for any necessary lab draws/dressing changes.  If the provider signs orders today but pt does not discharge until tomorrow, orders will need to be re-signed tomorrow.    Pt has already received IV abx dose for today.  Updated provider that pt can discharge once PICC is placed.  Provider acknowledged, will follow  up w/ vascular access to see if the line can be placed sooner rather than later.    11:45am - Met w/ pt at bedside, utilized phone  (family no longer present).  Updated pt that she can discharge today if PICC is placed, explained that meds will be delivered to home tomorrow and [facility] will do teaching at that time.  Explained that this writer is unsure of when specifically they may meet pt in-home, so advised to still consider staying home from work tomorrow, if necessary.  Pt expressed understanding, stated she would like to discharge to a family member's home at [address] [place] [id number].  Informed pt that this writer would update her if the new discharge address is an issue, pt expressed understanding.  Answered general questions throughout discussion to pt's satisfaction.    Updated Rani at [facility] to new discharge address, she stated this should not interfere w/ the current discharge plan.    Updated bedside RN to discharge plan, RN acknowledged.  RN has also reached out to vascular access requesting sooner placement of line.    Ultimately, PICC was placed and discharge orders were entered by provider.  Infusion referral missing.    Entered home infusion referral order for provider signature.  Notified provider, provider acknowledged.    No further care management intervention anticipated at this time.  Please re-consult if further needs arise.  Care management signing off.          Ike Keita RNCC    Social Work and Care Management Department     SEARCHABLE in [facility] - search CARE COORDINATOR     [place] ([phone]) Saturday & Sunday; ([phone]) [facility] Recognized Holidays     Units: 5A Onc 5201 - [phone] RNCC,  5A Onc 5220 thru 5240 RNCC, 5C OFFSERVICE [phone] RNCC & 5C OFF SERVICE [phone] RNCC     Units: 6B Vocera, 6C Card 6401 thru 6420 RNCC, 6C Card 6502 thru 6514 RNCC & 6C Card 6515 thru 6519 RNCC      Units: 7A SOT RNCC Vocera, 7B Med Surg Vocera, 7C Med Surg 7401  thru 7418 RNCC & 7C Med Surg 7502 thru 7595 RNCC     Units: 6A Vocera & 4A CVICU Vocera, 4C MICU Vocera, and 4E SICU Vocera       Units: 5 Ortho Vocera & 5 Med Surg Vocera      Units: 6 Med Surg Vocera & 8 Med Surg Vocera

## 2025-02-16 NOTE — PLAN OF CARE
Goal Outcome Evaluation:    Plan of Care Reviewed With: patient  Overall Patient Progress: no changeOverall Patient Progress: no change  Shift [id number]  No acute events overnight. Pt last BM was 2/13, PRN medication requested and given. R abd and R flank pain and declined prn medication. PCD declined. Voiding without difficulty. No other concern reported. POC ongoing.  Temp: 98.5  F (36.9  C) Temp src: Oral BP: 99/60 Pulse: 84   Resp: 18 SpO2: 98 % O2 Device: None (Room air)

## 2025-02-16 NOTE — PROGRESS NOTES
"Primary team:  Place imaging order(s) requested above prior to discharge.  Contact ID teams about missed ID clinic appointments and/or ongoing therapy needs.  [facility] sites only: Place order panel  OPAT Pharmacy ([facility]) Review and ID Care Transition     Prolonged Parenteral/Oral Antibiotic Recommendations and ID Follow up  This template provides final ID recommendations as of this date.     Infectious Diseases Indication: E coli bacteremia and pyelonephritis    Antibiotic Information  Name of Antibiotic Dose of Antibiotic1 Anticipated duration Effective start date2 End date   Ceftriaxone 2g qday 7 days 2/14/25 2/21/25                 1.Dose of antibiotic will need to be renally adjusted if creatinine clearance changes  2.Effective start date is the date of adequate therapy with appropriate spectrum    Method of antibiotic delivery:To be determined.Is the line being used for another indication besides antimicrobials? No At the end of therapy should the line used for antimicrobials be removed or de-accessed? Yes. Selecting \"yes\" will function as written order to remove PICC line or de-access the indwelling line at the end of therapy.    Weekly labs required: Creatinine, AST/ALT, and CBC with diff. Dr. Washburn will follow labs at discharge until ID follow up. Fax labs to ID clinic.    Are there pending microbial tests: Yes Cultures     Imaging for ID follow up: ID Imaging: No.     Due to short duration of therapy ID clinic follow up is not necessary.     ID provider - route this note: \"P PANDA\"    [facility] [place] [facility] ID Clinic Information:  Phone: [phone]  Fax: [phone] (Attention ID clinic nurses)   "

## 2025-02-16 NOTE — PROCEDURES
[facility]    Single Lumen PICC Placement    Date/Time: 2/16/2025 3:12 PM    Performed by: Naima Vega RN  Authorized by: Zohra Mcintyre MD  Indications: vascular access      UNIVERSAL PROTOCOL   Site Marked: Yes  Prior Images Obtained and Reviewed:  Yes  Required items: Required blood products, implants, devices and special equipment available    Patient identity confirmed:  Verbally with patient, arm band and hospital-assigned identification number  NA - No sedation, light sedation, or local anesthesia  Confirmation Checklist:  Patient's identity using two indicators, relevant allergies, procedure was appropriate and matched the consent or emergent situation and correct equipment/implants were available  Time out: Immediately prior to the procedure a time out was called    Universal Protocol: the Joint Commission Universal Protocol was followed    Preparation: Patient was prepped and draped in usual sterile fashion    ESBL (mL):  1     ANESTHESIA    Anesthesia:  Local infiltration  Local Anesthetic:  Lidocaine 1% without epinephrine  Anesthetic Total (mL):  2      SEDATION    Patient Sedated: No        Preparation: skin prepped with ChloraPrep  Skin prep agent: skin prep agent completely dried prior to procedure  Sterile barriers: maximum sterile barriers were used: cap, mask, sterile gown, sterile gloves, and large sterile sheet  Hand hygiene: hand hygiene performed prior to central venous catheter insertion  Type of line used: PICC  Catheter type: single lumen  Lumen type: power PICC and non-valved  Catheter size: 3 Fr  Brand: Bard  Lot number: [id number]  Placement method: venipuncture, MST, ultrasound and tip navigation system  Number of attempts: 1  Difficulty threading catheter: no  Successful placement: yes  Orientation: right  Catheter to Vein (%): 28  Location: brachial vein (medial)  Tip Location: SVC/RA Junction  Arm circumference: adults 10 cm  "(12)  Extremity circumference: 29  Visible catheter length: 3  Total catheter length: 38  Dressing and securement: adhesive securement device, alcohol impregnated caps, blood cleaned with CHG, blood removed, chlorhexidine disc applied, dressing applied, line secured, occlusive dressing applied, securement device, site cleansed, statlock, sterile dressing applied, subcutaneous anchor securement system and transparent securement dressing  Post procedure assessment: placement verified by 3CG technology, blood return through all ports and free fluid flow  PROCEDURE Describe Procedure: PICC placed in right upper extremity for anticipated 1 week of antibiotic therapy. Patient tolerated well and denies pain. Tip location verified at the cavoatrial junction (CAJ) using ECG technology. PICC is ready to use. To contact the Vascular Access team enter a \"nursing to consult for vascular access\" [id number] order in [facility].     Disposal: sharps and needle count correct at the end of procedure, needles and guidewire disposed in sharps container  Patient Tolerance:  Patient tolerated the procedure well with no immediate complications        "
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
## 2025-02-16 NOTE — PHARMACY
[facility]  Parenteral ANtibiotic Review at Departure from Acute Care Collaborative Note     Patient: Sofia Rodriguez  MRN: [id number]  Allergies: Patient has no known allergies.    Current Location: [facility] 8MS  OPAT to be provided by: [facility]       Line Type: PICC    Diagnosis/Indications: E.coli bacteremia due to pyelonephritis  Organism(s): E.coli  MRDO? No  Pending Cultures/Microbiological Tests: yes 2/14 blood culture finalization    Inpatient ID involved in developing OPAT plan: Yes - discharge OPAT plan has no changes from ID provider, Dr. Batsheva Washburn, OPAT plan charted on 2/16/2025    Outpatient ID Follow-up: ID OPAT Clinic Referral Placed ([facility] [place] [facility] Ph: [phone] and Fax: [phone]) - no appointment needed  Designated Provider: Dr. Batsheva Washburn    Antimicrobial Regimen / Route Anticipated  Duration Start Date Stop /  Reassess Date   Ceftriaxone 2 g every 24 hours/IV 7 days 2/14/2025 (first negative blood culture) 2/21/2025   Nitrofurantoin 100 mg every 24 hours/PO TBD - secondary suppression while pregnant 2/22/2025 TBD per OB/GYN team     Laboratory Tests and Monitoring Frequency: CBC with Diff, SCr, ALT, AST Once Weekly    Imaging/Miscellaneous Monitoring: None    ID Pharmacist Interventions: None                          Rani Cruz, PharmD, BCIDP  Pager: [phone]

## 2025-02-16 NOTE — CONSULTS
"PICC placed in right upper extremity for anticipated 1 week of antibiotic therapy. Patient tolerated well and denies pain. Tip location verified at the cavoatrial junction (CAJ) using ECG technology. PICC is ready to use. To contact the Vascular Access team enter a \"nursing to consult for vascular access\" [id number] order in [employer].      "

## 2025-02-16 NOTE — PROGRESS NOTES
Gyn Progress Note     Interview conducted with assistance of a      Patient is feeling better and good to go home. Amendable to PICC line for home abx infusions. Discussed plans for IV antibiotics until 2/21. Will transition to oral suppression therapy (Macrobid 100 mg daily) until end of pregnancy. Doptones 150 bpm    Once PICC in place ok to discharge     Iris Rice MD, MPH, MS  Obstetrics, Gynecology & Women's Health   Resident, PGY-3  02/16/2025 3:31 PM

## 2025-02-16 NOTE — PROGRESS NOTES
[facility] GENERAL INFECTIOUS DISEASES PROGRESS NOTE     Patient:  Sofia Rodriguez   YOB: 1980, MRN: [id number]  Date of Visit: 02/16/2025  Date of Admission: 2/13/2025  Consult Requester: Zohra Mcintyre MD          Assessment and Recommendations:   ID Problem List:  E. Coli Bacteremia 2/2 Pyelonephritis  Hx of Latent Tuberculosis, Untreated  Pregnancy, 18w gestation    45 yo female originally from [facility] who is 18 weeks pregnant and presented to the ED with right flank pain found to have E coli bacteremia thought to be secondary to pyelonephritis.     Blood cultures with pan-susceptible E coli, as well as in urine culture. With ongoing R flank pain and mild distention of R renal pelvis, agree that pyelonephritis is the likely etiology. She is clinically improving on ceftriaxone. I would prefer this is continued for 7-10 days via IV (either at an infusion center daily vs home - pending pts insurance coverage) due to tolerance, safety profile, and known efficacy. A potential oral option would be bactrim, but there is less clinical evidence with this than IV beta-lactams. As she is in the second trimester this should be ok from a pregnancy standpoint, but will defer to OB team. Discussed both options with pt today and she prefers continuing IV therapy for the treatment course. It would also be reasonable to continue antibiotic suppression while pt remains pregnant. Would prefer an oral cephalosporin given the susceptibility profile (ie cephalexin).      Recent positive quantioferon gold, originally from [facility]. CXR obtained on 2/13 without any cavitary lesions. No current respiratory symptoms. Consistent with latent tuberculosis. Would recommend she consider treatment after delivery as she has not immunosuppression besides being pregnant and no exposures. Prior HIV testing in 12/2024 was negative.    Recommendations:  Continue IV ceftriaxone 2g qday. Recommend 7-10 days of antibiotic  therapy for both pyelonephritis and bacteremia  PO bactrim 2 DS tabs BID would be an acceptable alternative.   Recommend involving care coordinators to look into IV antibiotic coverage (daily infusion center vs home)  Agree with antibiotic suppression for the remainder of pregnancy given the complicated presentation and mild hydronephrosis. Would prefer an oral cephalosporin.   Latent tuberculosis treatment can be discussed after delivery or when breastfeeding has ceased  Can refer patient to primary care or [facility] TB clinic (She is a resident of [facility])    Thank you for the consult. ID will continue to follow with you.    Batsheva Washburn MD   Infectious Diseases  Pager: [phone]  02/16/2025         Interval History and Events:   - No acute events overnight. Denies subjective fevers, chills. Overall feels much better. R sided flank pain persists, though is slightly better.   - No issues with antibiotics. Specifically denies GI upset, loose stools, or rashes.   - Leukocytosis resolved.   - No new culture growth.          Relevant Microbiology:   2/13/25 Urine culture: >100,000 CFU E coli  Susceptibility              Escherichia coli       SOFIYA       Ampicillin <=2 ug/mL Susceptible       Ampicillin/ Sulbactam <=2 ug/mL Susceptible       Cefazolin <=1 ug/mL Susceptible       Cefepime <=0.12 ug/mL Susceptible       Ceftazidime <=0.5 ug/mL Susceptible       Ceftriaxone <=0.25 ug/mL Susceptible       Ciprofloxacin <=0.06 ug/mL Susceptible       Ertapenem <=0.12 ug/mL Susceptible *       Extended Spectrum Beta-Lactamase Negative ug/mL ESBL Negative *       Gentamicin <=1 ug/mL Susceptible       Levofloxacin <=0.12 ug/mL Susceptible       Meropenem <=0.25 ug/mL Susceptible *       Nitrofurantoin <=16 ug/mL Susceptible       Piperacillin/Tazobactam <=4 ug/mL Susceptible       Trimethoprim/Sulfamethoxazole <=1/19 ug/mL Susceptible        2/13/25 Blood culture X1: E coli     Susceptibility               Escherichia coli       SOFIYA       Ampicillin <=2 ug/mL Susceptible       Ampicillin/ Sulbactam <=2 ug/mL Susceptible       Cefazolin <=1 ug/mL Susceptible *       Cefepime <=0.12 ug/mL Susceptible       Ceftazidime <=0.5 ug/mL Susceptible       Ceftriaxone <=0.25 ug/mL Susceptible       Ciprofloxacin <=0.06 ug/mL Susceptible       Ertapenem <=0.12 ug/mL Susceptible *       Extended Spectrum Beta-Lactamase Negative ug/mL ESBL Negative *       Gentamicin <=1 ug/mL Susceptible       Levofloxacin <=0.12 ug/mL Susceptible       Meropenem <=0.25 ug/mL Susceptible       Nitrofurantoin <=16 ug/mL Susceptible *       Piperacillin/Tazobactam <=4 ug/mL Susceptible       Trimethoprim/Sulfamethoxazole <=1/19 ug/mL Susceptible              2/14/25 Blood culture X2: NGTD         Antimicrobial Treatment:   Ceftriaxone 2/13 - present          Review of Systems:   Targeted 4 point ROS was completed with pertinent positives and negatives are detailed above.         Physical Examination:   Temp:  [98.5  F (36.9  C)-100.2  F (37.9  C)] 98.5  F (36.9  C)  Pulse:  [84-91] 91  Resp:  [18] 18  BP: (92-99)/(50-70) 92/70  SpO2:  [97 %-98 %] 97 %    Constitutional: Pleasant and cooperative female seen lying in bed, in NAD. Awake, alert, interactive.  HEENT: EOMI, sclera clear, conjunctiva normal, OP with MMM  Respiratory: No increased work of breathing, CTAB, no crackles or wheezing.  Cardiovascular: RRR, no murmur noted. No peripheral edema.  GI: Soft, non-distended and non-tender.  Back: TTP over R flank  Skin: Warm, dry, well-perfused. No bruising, bleeding, rashes, or lesions on limited exam.  Musculoskeletal: Extremities grossly normal, non-tender, no edema. Good strength and ROM in bed.   Neurologic: A&O. Answers questions appropriately, speech normal. Moves all extremities spontaneously.  Neuropsychiatric: Calm. Affect appropriate to situation.         Medications:     Current Facility-Administered Medications   Medication Dose Route  Frequency Provider Last Rate Last Admin    cefTRIAXone (ROCEPHIN) 2 g vial to attach to  ml bag for ADULTS or NS 50 ml bag for PEDS  2 g Intravenous Q24H Xander Garcia MD   2 g at 02/15/25 1023    prenatal multivitamin w/iron per tablet 1 tablet  1 tablet Oral Daily Xander Garcia MD   1 tablet at 02/16/25 0754    sodium chloride (PF) 0.9% PF flush 3 mL  3 mL Intracatheter Q8H Xander Garcia MD   3 mL at 02/16/25 0754       Antiinfectives:  Anti-infectives (From now, onward)      Start     Dose/Rate Route Frequency Ordered Stop    02/14/25 1014  cefTRIAXone (ROCEPHIN) 2 g vial to attach to  ml bag for ADULTS or NS 50 ml bag for PEDS         2 g  over 30 Minutes Intravenous EVERY 24 HOURS 02/14/25 1014                 Laboratory Data:   Metabolic Studies     Recent Labs   Lab Test 02/15/25  1419 02/15/25  0715 02/14/25  1301 02/14/25  0701 02/13/25  1142   NA  --  135  --  135 135   POTASSIUM 3.8 3.4 3.4 3.2* 3.2*   CHLORIDE  --  104  --  107 104   CO2  --  17*  --  16* 19*   ANIONGAP  --  14  --  12 12   BUN  --  10.4  --  8.6 9.4   CR  --  0.66  --  0.69 0.72   GFRESTIMATED  --  >90  --  >90 >90   GLC  --  79  --  94 100*   STEVE  --  9.1  --  9.1 8.9     Hepatic Studies    Recent Labs   Lab Test 02/14/25  0701 02/13/25  1142 08/15/23  1054   BILITOTAL 0.6 0.8 0.4   ALKPHOS 163* 165* 131*   ALBUMIN 3.2* 3.4* 4.3   AST 19 22 28   ALT 22 24 23     Hematology Studies      Recent Labs   Lab Test 02/15/25  0715 02/14/25  0701 02/13/25  1142   WBC 12.4* 18.6* 15.4*   HGB 10.2* 10.8* 10.8*   HCT 30.2* 31.7* 31.3*    218 242     Urine Studies     Recent Labs   Lab Test 02/13/25  1601   URINEPH 6.5   NITRITE Positive*   LEUKEST Large*   WBCU >182*            Imaging:   No new.

## 2025-02-17 ENCOUNTER — TELEPHONE (OUTPATIENT)
Dept: INFECTIOUS DISEASES | Facility: CLINIC | Age: 45
End: 2025-02-17
Payer: COMMERCIAL

## 2025-02-17 ENCOUNTER — HOME CARE VISIT (OUTPATIENT)
Dept: HOME HEALTH SERVICES | Facility: HOME HEALTH | Age: 45
End: 2025-02-17
Payer: COMMERCIAL

## 2025-02-17 ENCOUNTER — HOME INFUSION BILLING (OUTPATIENT)
Dept: HOME HEALTH SERVICES | Facility: HOME HEALTH | Age: 45
End: 2025-02-17
Payer: COMMERCIAL

## 2025-02-17 VITALS
DIASTOLIC BLOOD PRESSURE: 64 MMHG | SYSTOLIC BLOOD PRESSURE: 100 MMHG | RESPIRATION RATE: 16 BRPM | TEMPERATURE: 99 F | OXYGEN SATURATION: 98 % | HEART RATE: 84 BPM

## 2025-02-17 PROCEDURE — S9500 HIT ANTIBIOTIC Q24H DIEM: HCPCS

## 2025-02-17 PROCEDURE — A9270 NON-COVERED ITEM OR SERVICE: HCPCS

## 2025-02-17 PROCEDURE — A4245 ALCOHOL WIPES PER BOX: HCPCS

## 2025-02-17 PROCEDURE — 99601 HOME NFS VISIT <2 HRS: CPT

## 2025-02-17 PROCEDURE — A4452 WATERPROOF TAPE: HCPCS

## 2025-02-17 RX ORDER — CEFTRIAXONE SODIUM 10 G/100ML
2000 INJECTION, POWDER, FOR SOLUTION INTRAVENOUS EVERY 24 HOURS
Qty: 999999 ML | Refills: 0 | Status: DISPENSED | OUTPATIENT
Start: 2025-02-17 | End: 2025-02-25

## 2025-02-17 NOTE — PROGRESS NOTES
Nursing Visit Note:  Nurse visit today for Antibiotics setup, teach and review, and SOC assessment  for Sofia Rodriguez.     present during visit today: Yes, Language: Lao.     Intravenous Access:  PICC.    Education Provided:     Patient Education focused on Iv push Antibiotics via PICC line. SAS method..     Note: Utilized  during this visit.  Patient able to perform iv push med for most of her dosing procedure.  She feels comfortable and is independent with iv push med.      Saline administered: 30ml (ml)    Supply Check:   Does the patient have all the supplies they need for the next visit?  Yes    Next visit plan: Tuesday 2/18/2025, for PICC site care, labs draw, and follow up assessment.    Santa Tomlin RN 2/17/2025  
- - -

## 2025-02-17 NOTE — TELEPHONE ENCOUNTER
Follow up call to [facility], able to confirm with the nurse that the [facility] pharmacy team has placed orders for OPAT.  [facility] nurse able to confirm that yes a delivery has been set up, they will coordinate teaching an care in the home.    Dionne Ferguson, BSN, RN  RN Care Coordinator Infectious Disease Clinic

## 2025-02-17 NOTE — TELEPHONE ENCOUNTER
[facility] Call Center    Phone Message    May a detailed message be left on voicemail: yes     Reason for Call: Order(s): Home Care Orders: Other: Antibiotics  Rani states pt was discharged yesterday and is supposed to be getting 7 days of antibiotics through their picc line. Please call back to advise, verbal orders needed. Thank you.     Action Taken: Other: ID    Travel Screening: Not Applicable     Date of Service: 2/17/25

## 2025-02-17 NOTE — TELEPHONE ENCOUNTER
"Patient discharged, followed by [facility].  OPAT plan noted below. Due to short duration of therapy ID clinic follow up is not necessary.     MANJU CheemaN, RN  RN Care Coordinator Infectious Disease Clinic        Batsheva Washburn MD   Physician  Infectious Disease     Progress Notes      Signed     Date of Service: 2/16/2025  9:53 AM  Creation Time: 2/16/2025  9:53 AM       Primary team:  Place imaging order(s) requested above prior to discharge.  Contact ID teams about missed ID clinic appointments and/or ongoing therapy needs.  [facility] sites only: Place order panel  OPAT Pharmacy ([facility]) Review and ID Care Transition      Prolonged Parenteral/Oral Antibiotic Recommendations and ID Follow up  This template provides final ID recommendations as of this date.      Infectious Diseases Indication: E coli bacteremia and pyelonephritis     Antibiotic Information  Name of Antibiotic Dose of Antibiotic1 Anticipated duration Effective start date2 End date   Ceftriaxone 2g qday 7 days 2/14/25 2/21/25                           1.Dose of antibiotic will need to be renally adjusted if creatinine clearance changes  2.Effective start date is the date of adequate therapy with appropriate spectrum     Method of antibiotic delivery:To be determined.Is the line being used for another indication besides antimicrobials? No At the end of therapy should the line used for antimicrobials be removed or de-accessed? Yes. Selecting \"yes\" will function as written order to remove PICC line or de-access the indwelling line at the end of therapy.     Weekly labs required: Creatinine, AST/ALT, and CBC with diff. Dr. Washburn will follow labs at discharge until ID follow up. Fax labs to ID clinic.     Are there pending microbial tests: Yes Cultures      Imaging for ID follow up: ID Imaging: No.      Due to short duration of therapy ID clinic follow up is not necessary.      ID provider - route this note: \"P PANDA\"     [facility] " [facility] ID Clinic Information:  Phone: [phone]  Fax: [phone] ([facility] ID clinic nurses             [facility], [facility]  Parenteral ANtibiotic Review at Departure from Acute Care Collaborative Note      Patient: Sofia Rodriguez  MRN: [id number]  Allergies: Patient has no known allergies.     Current Location:  8MS  OPAT to be provided by: [facility]       Line Type: PICC     Diagnosis/Indications: E.coli bacteremia due to pyelonephritis  Organism(s): E.coli  MRDO? No  Pending Cultures/Microbiological Tests: yes 2/14 blood culture finalization     Inpatient ID involved in developing OPAT plan: Yes - discharge OPAT plan has no changes from ID provider, Dr. Batsheva Washburn, OPAT plan charted on 2/16/2025     Outpatient ID Follow-up: ID OPAT Clinic Referral Placed ([facility] ID Clinic Ph: [phone] and Fax: [phone]) - no appointment needed  Designated Provider: Dr. Batsheva Washburn     Antimicrobial Regimen / Route Anticipated  Duration Start Date Stop /  Reassess Date   Ceftriaxone 2 g every 24 hours/IV 7 days 2/14/2025 (first negative blood culture) 2/21/2025   Nitrofurantoin 100 mg every 24 hours/PO TBD - secondary suppression while pregnant 2/22/2025 TBD per OB/GYN team      Laboratory Tests and Monitoring Frequency: CBC with Diff, SCr, ALT, AST Once Weekly     Imaging/Miscellaneous Monitoring: None     ID Pharmacist Interventions: None                           Rani Cruz, PharmD, BCIDP  Pager: [phone]

## 2025-02-18 PROCEDURE — S9500 HIT ANTIBIOTIC Q24H DIEM: HCPCS

## 2025-02-19 LAB
BACTERIA BLD CULT: NO GROWTH
BACTERIA BLD CULT: NO GROWTH

## 2025-02-19 PROCEDURE — S9500 HIT ANTIBIOTIC Q24H DIEM: HCPCS

## 2025-02-20 ENCOUNTER — HOME CARE VISIT (OUTPATIENT)
Dept: HOME HEALTH SERVICES | Facility: HOME HEALTH | Age: 45
End: 2025-02-20
Payer: COMMERCIAL

## 2025-02-20 PROCEDURE — S9500 HIT ANTIBIOTIC Q24H DIEM: HCPCS

## 2025-02-20 NOTE — PROGRESS NOTES
Writer drove to patients home and prior to entering home, reviewed careplan. Careplan states for labs to be drawn on Tuesdays and that LOT will finish tomorrow, 2/21/25. Writer called [facility] pharmacy/resource RN to clarify pt s plan of care and if she needs labs today (Thursday 2/20) as they are not actually ordered today. Additionally, if pt is having line removed tomorrow, she would not require CLC.     After discussion it was confirmed pt does not need labs or CLC today. Pt needs to be added to the schedule tomorrow 2/21 for line pull following her last dose.     Writer called patient through  services and explained this plan with pt. Pt verbalized understanding and confirmed her dressing is intact and that she will be done with her final dose around 1300 tomorrow. Writer communicated with staffing to make sure pt has appt scheduled for line pull. Pt is aware.     Kelin Cobos RN

## 2025-02-21 PROCEDURE — S9500 HIT ANTIBIOTIC Q24H DIEM: HCPCS

## 2025-02-22 PROCEDURE — S9500 HIT ANTIBIOTIC Q24H DIEM: HCPCS

## 2025-02-23 PROCEDURE — S9500 HIT ANTIBIOTIC Q24H DIEM: HCPCS

## 2025-02-24 ENCOUNTER — TELEPHONE (OUTPATIENT)
Dept: INFECTIOUS DISEASES | Facility: CLINIC | Age: 45
End: 2025-02-24
Payer: COMMERCIAL

## 2025-02-24 PROCEDURE — S9500 HIT ANTIBIOTIC Q24H DIEM: HCPCS

## 2025-02-25 PROCEDURE — S9500 HIT ANTIBIOTIC Q24H DIEM: HCPCS

## 2025-03-05 ENCOUNTER — HOSPITAL ENCOUNTER (OUTPATIENT)
Dept: CARDIOLOGY | Facility: CLINIC | Age: 45
Discharge: HOME | End: 2025-03-05
Attending: STUDENT IN AN ORGANIZED HEALTH CARE EDUCATION/TRAINING PROGRAM
Payer: COMMERCIAL

## 2025-03-05 ENCOUNTER — OFFICE VISIT (OUTPATIENT)
Dept: PEDIATRIC CARDIOLOGY | Facility: CLINIC | Age: 45
End: 2025-03-05
Payer: COMMERCIAL

## 2025-03-05 DIAGNOSIS — O35.BXX0 ANOMALY OF HEART OF FETUS AFFECTING PREGNANCY, ANTEPARTUM, SINGLE OR UNSPECIFIED FETUS: Primary | ICD-10-CM

## 2025-03-05 DIAGNOSIS — O35.BXX0 FETAL VENTRICULAR SEPTAL DEFECT AFFECTING ANTEPARTUM CARE OF MOTHER, SINGLE OR UNSPECIFIED FETUS: ICD-10-CM

## 2025-03-05 PROCEDURE — T1013 SIGN LANG/ORAL INTERPRETER: HCPCS | Mod: U4

## 2025-03-05 PROCEDURE — 93325 DOPPLER ECHO COLOR FLOW MAPG: CPT

## 2025-03-05 NOTE — PROGRESS NOTES
[facility]   Heart Center Fetal Consult Note    Patient:  Sofia Rodriguez MRN:  [id number]   YOB: 1980 Age:  44 year old   Date of Visit:  3/5/2025 PCP:  Bianca Cobb APRN CNM     Dear Doctor:    I had the pleasure of seeing Sofia Rodriguez at the [facility] on 3/5/2025 in fetal cardiology consultation for fetal echocardiogram results. She presented today herself. As you know, she is a 44 year old  at 21w5d who presented for fetal echocardiogram today because of concern for a VSD on screening ultrasound.    I performed and interpreted the fetal echocardiogram today, which demonstrated:  There is a small muscular ventricular septal defect. Normal right and left ventricular size and function. Fetal heart rate is regular at 148 bpm. No hydrops.    I reviewed today's findings with Sofia Rodriguez with the aid of a  and using a diagram. There is a small muscular VSD with otherwise normal fetal cardiac anatomy. I discussed the natural history of small muscular VSDs, that 90% resolve spontaneously. Based on the size and location of this defect I expect it to resolve spontaneously and I don't expect the baby develop symptoms or require surgery.      Plan:    1. Outpatient cardiology visit with ECHO at 4-6 weeks of life  2. No change in delivery plan needed  3. No further follow-up in fetal cardiology    Thank you for allowing me to participate in Sofia's care. Please do not hesitate to contact me with questions or concerns.    This visit was separate from the performance and interpretation of the ultrasound. The majority of the time (>50%) was spent in counseling and coordination of care. I spent a total of 45 minutes on the day of the visit.    Marcus Acharya M.D.  Pediatric Cardiology  [address] Academic Office Building 4th floor, [facility]  [address]  Phone [phone]  Fax [phone]

## 2025-03-05 NOTE — LETTER
3/5/2025      RE: Sofia Rodriguez  [address]  [place] [address]     Dear Colleague,    Thank you for the opportunity to participate in the care of your patient, Sofia Rodriguez, at the [facility] at [facility]. Please see a copy of my visit note below.    [facility]   Heart Center Fetal Consult Note    Patient:  Sofia Rodriguez MRN:  [id number]   YOB: 1980 Age:  44 year old   Date of Visit:  3/5/2025 PCP:  Bianca Cobb APRN CNM     Dear Doctor:    I had the pleasure of seeing Sofia Rodriguez at the [facility] on 3/5/2025 in fetal cardiology consultation for fetal echocardiogram results. She presented today herself. As you know, she is a 44 year old  at 21w5d who presented for fetal echocardiogram today because of concern for a VSD on screening ultrasound.    I performed and interpreted the fetal echocardiogram today, which demonstrated:  There is a small muscular ventricular septal defect. Normal right and left ventricular size and function. Fetal heart rate is regular at 148 bpm. No hydrops.    I reviewed today's findings with Sofia Rodriguez with the aid of a  and using a diagram. There is a small muscular VSD with otherwise normal fetal cardiac anatomy. I discussed the natural history of small muscular VSDs, that 90% resolve spontaneously. Based on the size and location of this defect I expect it to resolve spontaneously and I don't expect the baby develop symptoms or require surgery.      Plan:    1. Outpatient cardiology visit with ECHO at 4-6 weeks of life  2. No change in delivery plan needed  3. No further follow-up in fetal cardiology    Thank you for allowing me to participate in Sofia's care. Please do not hesitate to contact me with questions or concerns.    This  visit was separate from the performance and interpretation of the ultrasound. The majority of the time (>50%) was spent in counseling and coordination of care. I spent a total of 45 minutes on the day of the visit.    Marcus Acharya M.D.  Pediatric Cardiology  [facility]'[address]th [place] [address]  Phone [phone]  Fax [phone]      Please do not hesitate to contact me if you have any questions/concerns.     Sincerely,       Samuel Acharya MD

## 2025-03-10 ENCOUNTER — OFFICE VISIT (OUTPATIENT)
Dept: MATERNAL FETAL MEDICINE | Facility: CLINIC | Age: 45
End: 2025-03-10
Attending: STUDENT IN AN ORGANIZED HEALTH CARE EDUCATION/TRAINING PROGRAM
Payer: COMMERCIAL

## 2025-03-10 ENCOUNTER — HOSPITAL ENCOUNTER (OUTPATIENT)
Dept: ULTRASOUND IMAGING | Facility: CLINIC | Age: 45
Discharge: HOME | End: 2025-03-10
Attending: STUDENT IN AN ORGANIZED HEALTH CARE EDUCATION/TRAINING PROGRAM
Payer: COMMERCIAL

## 2025-03-10 DIAGNOSIS — O35.BXX0 FETAL VENTRICULAR SEPTAL DEFECT AFFECTING ANTEPARTUM CARE OF MOTHER, SINGLE OR UNSPECIFIED FETUS: Primary | ICD-10-CM

## 2025-03-10 DIAGNOSIS — O35.BXX0 FETAL VENTRICULAR SEPTAL DEFECT AFFECTING ANTEPARTUM CARE OF MOTHER, SINGLE OR UNSPECIFIED FETUS: ICD-10-CM

## 2025-03-10 PROCEDURE — 76816 OB US FOLLOW-UP PER FETUS: CPT

## 2025-03-10 PROCEDURE — 76816 OB US FOLLOW-UP PER FETUS: CPT | Mod: 26 | Performed by: STUDENT IN AN ORGANIZED HEALTH CARE EDUCATION/TRAINING PROGRAM

## 2025-03-10 PROCEDURE — 99213 OFFICE O/P EST LOW 20 MIN: CPT | Mod: 25 | Performed by: STUDENT IN AN ORGANIZED HEALTH CARE EDUCATION/TRAINING PROGRAM

## 2025-03-10 NOTE — NURSING NOTE
Sofia presents to [facility] for follow up ultraound.  used for visit today via IPAD ID#[id number]. Patient reports good fetal movement, denies contractions, leaking of fluid, or bleeding.   SBAR given to GRETA MD, see their note in Epic.

## 2025-04-21 ENCOUNTER — HOSPITAL ENCOUNTER (OUTPATIENT)
Dept: ULTRASOUND IMAGING | Facility: CLINIC | Age: 45
Discharge: HOME | End: 2025-04-21
Attending: OBSTETRICS & GYNECOLOGY
Payer: COMMERCIAL

## 2025-04-21 ENCOUNTER — OFFICE VISIT (OUTPATIENT)
Dept: MATERNAL FETAL MEDICINE | Facility: CLINIC | Age: 45
End: 2025-04-21
Attending: OBSTETRICS & GYNECOLOGY
Payer: COMMERCIAL

## 2025-04-21 ENCOUNTER — OFFICE VISIT (OUTPATIENT)
Dept: INTERPRETER SERVICES | Facility: CLINIC | Age: 45
End: 2025-04-21

## 2025-04-21 DIAGNOSIS — O35.BXX0 FETAL VENTRICULAR SEPTAL DEFECT AFFECTING ANTEPARTUM CARE OF MOTHER, SINGLE OR UNSPECIFIED FETUS: ICD-10-CM

## 2025-04-21 DIAGNOSIS — O35.BXX0 FETAL VENTRICULAR SEPTAL DEFECT AFFECTING ANTEPARTUM CARE OF MOTHER, SINGLE OR UNSPECIFIED FETUS: Primary | ICD-10-CM

## 2025-04-21 DIAGNOSIS — O09.523 MULTIGRAVIDA OF ADVANCED MATERNAL AGE IN THIRD TRIMESTER: ICD-10-CM

## 2025-04-21 PROCEDURE — T1013 SIGN LANG/ORAL INTERPRETER: HCPCS | Mod: U4

## 2025-04-21 PROCEDURE — 76816 OB US FOLLOW-UP PER FETUS: CPT

## 2025-04-21 NOTE — PROGRESS NOTES
The patient was seen for an ultrasound in the Maternal-Fetal Medicine Center at the [facility] [address] today.  For a detailed report of the ultrasound examination, please see the ultrasound report which can be found under the imaging tab.    If you have questions regarding today's evaluation or if we can be of further service, please contact the Maternal-Fetal Medicine Center.    Kallie Velasquez MD  , OB/GYN  Maternal-Fetal Medicine  [phone] (Pager)

## 2025-04-21 NOTE — NURSING NOTE
Patient presents to [facility] for RL2 at 28w3d due to VSD, growth, AMA. Positive fetal movement. Denies LOF, vaginal bleeding or cramping/contractions. SBAR given to [facility] MD, see their note in Epic.     Ipad  used for duration of appointment.

## 2025-05-19 ENCOUNTER — HOSPITAL ENCOUNTER (OUTPATIENT)
Facility: CLINIC | Age: 45
Discharge: HOME | End: 2025-05-19
Attending: OBSTETRICS & GYNECOLOGY | Admitting: STUDENT IN AN ORGANIZED HEALTH CARE EDUCATION/TRAINING PROGRAM
Payer: COMMERCIAL

## 2025-05-19 ENCOUNTER — OFFICE VISIT (OUTPATIENT)
Dept: MATERNAL FETAL MEDICINE | Facility: CLINIC | Age: 45
End: 2025-05-19
Attending: OBSTETRICS & GYNECOLOGY
Payer: COMMERCIAL

## 2025-05-19 ENCOUNTER — HOSPITAL ENCOUNTER (OUTPATIENT)
Dept: ULTRASOUND IMAGING | Facility: CLINIC | Age: 45
Discharge: HOME | End: 2025-05-19
Attending: OBSTETRICS & GYNECOLOGY
Payer: COMMERCIAL

## 2025-05-19 VITALS — SYSTOLIC BLOOD PRESSURE: 101 MMHG | DIASTOLIC BLOOD PRESSURE: 60 MMHG | TEMPERATURE: 98.1 F | RESPIRATION RATE: 16 BRPM

## 2025-05-19 DIAGNOSIS — O35.BXX0 FETAL VENTRICULAR SEPTAL DEFECT AFFECTING ANTEPARTUM CARE OF MOTHER, SINGLE OR UNSPECIFIED FETUS: ICD-10-CM

## 2025-05-19 DIAGNOSIS — O09.523 MULTIGRAVIDA OF ADVANCED MATERNAL AGE IN THIRD TRIMESTER: ICD-10-CM

## 2025-05-19 DIAGNOSIS — O35.BXX0 FETAL VENTRICULAR SEPTAL DEFECT AFFECTING ANTEPARTUM CARE OF MOTHER, SINGLE OR UNSPECIFIED FETUS: Primary | ICD-10-CM

## 2025-05-19 DIAGNOSIS — Z36.89 ENCOUNTER FOR TRIAGE IN PREGNANT PATIENT: Primary | ICD-10-CM

## 2025-05-19 LAB
ALBUMIN UR-MCNC: NEGATIVE MG/DL
APPEARANCE UR: CLEAR
BILIRUB UR QL STRIP: NEGATIVE
CLUE CELLS: PRESENT
COLOR UR AUTO: ABNORMAL
GLUCOSE UR STRIP-MCNC: 30 MG/DL
HGB UR QL STRIP: NEGATIVE
KETONES UR STRIP-MCNC: NEGATIVE MG/DL
LEUKOCYTE ESTERASE UR QL STRIP: NEGATIVE
MUCOUS THREADS #/AREA URNS LPF: PRESENT /LPF
NITRATE UR QL: NEGATIVE
PH UR STRIP: 7 [PH] (ref 5–7)
RBC URINE: 1 /HPF
SP GR UR STRIP: 1.01 (ref 1–1.03)
SQUAMOUS EPITHELIAL: 2 /HPF
TRICHOMONAS, WET PREP: ABNORMAL
UROBILINOGEN UR STRIP-MCNC: NORMAL MG/DL
WBC URINE: 0 /HPF
WBC'S/HIGH POWER FIELD, WET PREP: ABNORMAL
YEAST, WET PREP: ABNORMAL

## 2025-05-19 PROCEDURE — 81001 URINALYSIS AUTO W/SCOPE: CPT | Performed by: STUDENT IN AN ORGANIZED HEALTH CARE EDUCATION/TRAINING PROGRAM

## 2025-05-19 PROCEDURE — G0463 HOSPITAL OUTPT CLINIC VISIT: HCPCS | Mod: 25

## 2025-05-19 PROCEDURE — 59025 FETAL NON-STRESS TEST: CPT

## 2025-05-19 PROCEDURE — 87210 SMEAR WET MOUNT SALINE/INK: CPT | Performed by: STUDENT IN AN ORGANIZED HEALTH CARE EDUCATION/TRAINING PROGRAM

## 2025-05-19 PROCEDURE — 76816 OB US FOLLOW-UP PER FETUS: CPT

## 2025-05-19 RX ORDER — METRONIDAZOLE 7.5 MG/G
1 GEL VAGINAL 2 TIMES DAILY
Qty: 50 G | Refills: 0 | Status: SHIPPED | OUTPATIENT
Start: 2025-05-19 | End: 2025-05-24

## 2025-05-19 RX ORDER — LIDOCAINE 40 MG/G
CREAM TOPICAL
Status: DISCONTINUED | OUTPATIENT
Start: 2025-05-19 | End: 2025-05-19 | Stop reason: HOSPADM

## 2025-05-19 ASSESSMENT — ACTIVITIES OF DAILY LIVING (ADL)
ADLS_ACUITY_SCORE: 17
ADLS_ACUITY_SCORE: 17

## 2025-05-19 NOTE — PLAN OF CARE
Data: Patient came in via ambulance and was admitted to room [facility] 3 at 0329. Patient is a . Prenatal record reviewed.   OB History    Para Term  AB Living   5 3 3 0 1 3   SAB IAB Ectopic Multiple Live Births   0 0 0 0 3      # Outcome Date GA Lbr Italo/2nd Weight Sex Type Anes PTL Lv   5 Current            4 Term  41w3d    Vag-Spont   ANABEL   3 Term 05   2.722 kg (6 lb)  Vag-Spont   ANABEL   2 Term 04 41w1d  3.657 kg (8 lb 1 oz)  Vag-Spont  N ANABEL   1 AB            .  Medical History:   Past Medical History:   Diagnosis Date    Anemia, iron deficiency     Anxiety     Cystocele     Fibroid uterus     Gastric ulcer due to Helicobacter pylori, acute     Hypothyroidism     PTSD (post-traumatic stress disorder)     Pyelonephritis    .  Gestational age 32w3d. Vital signs per doc flowsheet. Fetal movement present. Patient reports Vaginal Bleeding   as reason for admission. Pt also said that she wasn't feeling baby move as much. Support persons son Phillip present.  Action: Via ,  Verbal consent for EFM, external fetal monitors applied. Admission assessment completed. Patient and support persons educated on labor process. Patient instructed to report change in fetal movement, contractions, vaginal leaking of fluid or bleeding, abdominal pain, or any concerns related to the pregnancy to her nurse/physician. Patient oriented to room, call light in reach.   Response: 0405: Dr. Moon informed of vaginal bleeding, but no new bleeding category 1 tracing with no contractions present just irritability. Plan per provider is to order wet prep and UA. Patient verbalized understanding of education and verbalized agreement with plan.     0505: Pt wet prep came back positive for clue cells. MD called and order placed for Metrogel e prescribed to pt pharmacy.     Discharge instructions reviewed and printed in Thai for pt. All questions answered. Pt instructed to follow up with her clinic and to  get an appointment asap. Pt had ultrasound with MFM today at 1300. Pt discharge home with son ambulatory at 0536

## 2025-05-19 NOTE — NURSING NOTE
IPAD  used for MFM appointment.  Patient reports positive fetal movement, no pain, no contractions or leaking of fluid.  Had spotting yesterday and was seen in [facility].  Prescribed metrogel and will  after appointment today. SBAR given to MFM MD, see their note in Epic.

## 2025-06-17 ENCOUNTER — OFFICE VISIT (OUTPATIENT)
Dept: MATERNAL FETAL MEDICINE | Facility: CLINIC | Age: 45
End: 2025-06-17
Attending: OBSTETRICS & GYNECOLOGY
Payer: COMMERCIAL

## 2025-06-17 ENCOUNTER — HOSPITAL ENCOUNTER (OUTPATIENT)
Dept: ULTRASOUND IMAGING | Facility: CLINIC | Age: 45
Discharge: HOME | End: 2025-06-17
Attending: OBSTETRICS & GYNECOLOGY
Payer: COMMERCIAL

## 2025-06-17 DIAGNOSIS — O35.BXX0 FETAL VENTRICULAR SEPTAL DEFECT AFFECTING ANTEPARTUM CARE OF MOTHER, SINGLE OR UNSPECIFIED FETUS: ICD-10-CM

## 2025-06-17 DIAGNOSIS — O09.523 MULTIGRAVIDA OF ADVANCED MATERNAL AGE IN THIRD TRIMESTER: ICD-10-CM

## 2025-06-17 DIAGNOSIS — O09.523 MULTIGRAVIDA OF ADVANCED MATERNAL AGE IN THIRD TRIMESTER: Primary | ICD-10-CM

## 2025-06-17 PROCEDURE — 76819 FETAL BIOPHYS PROFIL W/O NST: CPT

## 2025-06-17 NOTE — NURSING NOTE
IPAD  used for appointment.  Patient reports positive fetal movement, no pain, no contractions, leaking of fluid, or bleeding.  Education provided to patient on biophysical profile.  SBAR given to GRETA BARRAGAN, see their note in Epic.

## 2025-06-17 NOTE — PROGRESS NOTES
"Please see \"Imaging\" tab under \"Chart Review\" for details of today's visit.    Lilliana Hebert    "

## 2025-06-23 ENCOUNTER — OFFICE VISIT (OUTPATIENT)
Dept: INTERPRETER SERVICES | Facility: CLINIC | Age: 45
End: 2025-06-23
Payer: COMMERCIAL

## 2025-06-23 ENCOUNTER — OFFICE VISIT (OUTPATIENT)
Dept: MATERNAL FETAL MEDICINE | Facility: CLINIC | Age: 45
End: 2025-06-23
Attending: STUDENT IN AN ORGANIZED HEALTH CARE EDUCATION/TRAINING PROGRAM
Payer: COMMERCIAL

## 2025-06-23 ENCOUNTER — HOSPITAL ENCOUNTER (OUTPATIENT)
Dept: ULTRASOUND IMAGING | Facility: CLINIC | Age: 45
Discharge: HOME | End: 2025-06-23
Attending: STUDENT IN AN ORGANIZED HEALTH CARE EDUCATION/TRAINING PROGRAM | Admitting: STUDENT IN AN ORGANIZED HEALTH CARE EDUCATION/TRAINING PROGRAM
Payer: COMMERCIAL

## 2025-06-23 DIAGNOSIS — E07.9 THYROID DISEASE AFFECTING PREGNANCY: ICD-10-CM

## 2025-06-23 DIAGNOSIS — O09.299 HISTORY OF PRE-ECLAMPSIA IN PRIOR PREGNANCY, CURRENTLY PREGNANT: ICD-10-CM

## 2025-06-23 DIAGNOSIS — O35.BXX0 FETAL VENTRICULAR SEPTAL DEFECT AFFECTING ANTEPARTUM CARE OF MOTHER, SINGLE OR UNSPECIFIED FETUS: ICD-10-CM

## 2025-06-23 DIAGNOSIS — O99.280 THYROID DISEASE AFFECTING PREGNANCY: ICD-10-CM

## 2025-06-23 DIAGNOSIS — O09.523 MULTIGRAVIDA OF ADVANCED MATERNAL AGE IN THIRD TRIMESTER: ICD-10-CM

## 2025-06-23 DIAGNOSIS — O09.523 MULTIGRAVIDA OF ADVANCED MATERNAL AGE IN THIRD TRIMESTER: Primary | ICD-10-CM

## 2025-06-23 PROCEDURE — T1013 SIGN LANG/ORAL INTERPRETER: HCPCS | Mod: GT

## 2025-06-23 PROCEDURE — 76819 FETAL BIOPHYS PROFIL W/O NST: CPT

## 2025-06-23 NOTE — NURSING NOTE
IPAD  used for MFM appointment. Patient reports positive fetal movement, no pain, no contractions, leaking of fluid, or bleeding.  SBAR given to MFM MD, see their note in Epic.

## 2025-06-23 NOTE — PROGRESS NOTES
The patient was seen for an ultrasound in the Maternal-Fetal Medicine Center today.  For a detailed report of the ultrasound examination, please see the ultrasound report which can be found under the imaging tab.    If you have questions regarding today's evaluation or if we can be of further service, please contact the Maternal-Fetal Medicine Center.    Telma Olivares MD  , OB/GYN  Maternal-Fetal Medicine

## 2025-06-28 ENCOUNTER — HOSPITAL ENCOUNTER (INPATIENT)
Facility: CLINIC | Age: 45
LOS: 3 days | Discharge: HOME HEALTH | End: 2025-07-01
Attending: ADVANCED PRACTICE MIDWIFE | Admitting: ADVANCED PRACTICE MIDWIFE
Payer: COMMERCIAL

## 2025-06-28 ENCOUNTER — ANESTHESIA EVENT (OUTPATIENT)
Dept: OBGYN | Facility: CLINIC | Age: 45
End: 2025-06-28
Payer: COMMERCIAL

## 2025-06-28 ENCOUNTER — ANESTHESIA (OUTPATIENT)
Dept: OBGYN | Facility: CLINIC | Age: 45
End: 2025-06-28
Payer: COMMERCIAL

## 2025-06-28 DIAGNOSIS — O46.90 VAGINAL BLEEDING IN PREGNANCY: ICD-10-CM

## 2025-06-28 DIAGNOSIS — Z98.891 S/P CESAREAN SECTION: Primary | ICD-10-CM

## 2025-06-28 DIAGNOSIS — F41.1 GENERALIZED ANXIETY DISORDER: ICD-10-CM

## 2025-06-28 DIAGNOSIS — Z87.898 HISTORY OF DOMESTIC VIOLENCE: ICD-10-CM

## 2025-06-28 DIAGNOSIS — Z34.90 ENCOUNTER FOR INDUCTION OF LABOR: ICD-10-CM

## 2025-06-28 PROBLEM — D50.0 IRON DEFICIENCY ANEMIA DUE TO CHRONIC BLOOD LOSS: Status: ACTIVE | Noted: 2017-02-07

## 2025-06-28 PROBLEM — O35.BXX0 VENTRICULAR SEPTAL DEFECT (VSD) OF FETUS IN SINGLETON PREGNANCY, ANTEPARTUM: Status: ACTIVE | Noted: 2025-02-21

## 2025-06-28 PROBLEM — R87.810 PAP SMEAR OF CERVIX SHOWS HIGH RISK HPV PRESENT: Status: ACTIVE | Noted: 2017-10-16

## 2025-06-28 PROBLEM — F43.23 ADJUSTMENT DISORDER WITH MIXED ANXIETY AND DEPRESSED MOOD: Status: ACTIVE | Noted: 2020-06-26

## 2025-06-28 PROBLEM — O34.10 UTERINE FIBROID COMPLICATING ANTENATAL CARE, BABY NOT YET DELIVERED: Status: ACTIVE | Noted: 2025-02-21

## 2025-06-28 PROBLEM — R76.12 POSITIVE QUANTIFERON-TB GOLD TEST: Status: ACTIVE | Noted: 2025-01-22

## 2025-06-28 PROBLEM — A04.8 HELICOBACTER PYLORI INFECTION: Status: ACTIVE | Noted: 2025-04-15

## 2025-06-28 PROBLEM — O09.90 SUPERVISION OF HIGH-RISK PREGNANCY: Status: ACTIVE | Noted: 2025-02-21

## 2025-06-28 PROBLEM — F43.10 POST TRAUMATIC STRESS DISORDER: Status: ACTIVE | Noted: 2025-03-17

## 2025-06-28 PROBLEM — Z87.42 HISTORY OF AMENORRHEA: Status: ACTIVE | Noted: 2022-06-01

## 2025-06-28 PROBLEM — D25.9 UTERINE FIBROID COMPLICATING ANTENATAL CARE, BABY NOT YET DELIVERED: Status: ACTIVE | Noted: 2025-02-21

## 2025-06-28 PROBLEM — Z62.810 HISTORY OF SEXUAL ABUSE IN CHILDHOOD: Status: ACTIVE | Noted: 2025-06-28

## 2025-06-28 LAB
ABO + RH BLD: NORMAL
APTT PPP: 29 SECONDS (ref 22–38)
APTT PPP: 29 SECONDS (ref 22–38)
BASOPHILS # BLD AUTO: 0 10E3/UL (ref 0–0.2)
BASOPHILS NFR BLD AUTO: 0 %
BLD GP AB SCN SERPL QL: NEGATIVE
BLD PROD TYP BPU: NORMAL
BLD PROD TYP BPU: NORMAL
BLOOD COMPONENT TYPE: NORMAL
BLOOD COMPONENT TYPE: NORMAL
CODING SYSTEM: NORMAL
CODING SYSTEM: NORMAL
CROSSMATCH: NORMAL
CROSSMATCH: NORMAL
EOSINOPHIL # BLD AUTO: 0.1 10E3/UL (ref 0–0.7)
EOSINOPHIL NFR BLD AUTO: 1 %
ERYTHROCYTE [DISTWIDTH] IN BLOOD BY AUTOMATED COUNT: 13.7 % (ref 10–15)
ERYTHROCYTE [DISTWIDTH] IN BLOOD BY AUTOMATED COUNT: 13.7 % (ref 10–15)
ERYTHROCYTE [DISTWIDTH] IN BLOOD BY AUTOMATED COUNT: 13.9 % (ref 10–15)
FETAL RBC % LFV: 0 %
FETAL RBC (ML): 0 ML
FIBRINOGEN PPP-MCNC: 499 MG/DL (ref 170–510)
FIBRINOGEN PPP-MCNC: 551 MG/DL (ref 170–510)
HCT VFR BLD AUTO: 36 % (ref 35–47)
HCT VFR BLD AUTO: 36.5 % (ref 35–47)
HCT VFR BLD AUTO: 36.7 % (ref 35–47)
HGB BLD-MCNC: 12.4 G/DL (ref 11.7–15.7)
HGB BLD-MCNC: 12.5 G/DL (ref 11.7–15.7)
HGB BLD-MCNC: 12.7 G/DL (ref 11.7–15.7)
IF INDICATED RECOMMENDED DOSE OF RH IMMUNE GLOBULIN UG: 300 UG (ref ?–300)
IMM GRANULOCYTES # BLD: 0 10E3/UL
IMM GRANULOCYTES NFR BLD: 0 %
INR PPP: 0.87 (ref 0.85–1.15)
INR PPP: 0.89 (ref 0.85–1.15)
LYMPHOCYTES # BLD AUTO: 1.7 10E3/UL (ref 0.8–5.3)
LYMPHOCYTES NFR BLD AUTO: 17 %
MCH RBC QN AUTO: 31.7 PG (ref 26.5–33)
MCH RBC QN AUTO: 32.3 PG (ref 26.5–33)
MCH RBC QN AUTO: 32.3 PG (ref 26.5–33)
MCHC RBC AUTO-ENTMCNC: 34 G/DL (ref 31.5–36.5)
MCHC RBC AUTO-ENTMCNC: 34.6 G/DL (ref 31.5–36.5)
MCHC RBC AUTO-ENTMCNC: 34.7 G/DL (ref 31.5–36.5)
MCV RBC AUTO: 93 FL (ref 78–100)
MONOCYTES # BLD AUTO: 0.7 10E3/UL (ref 0–1.3)
MONOCYTES NFR BLD AUTO: 7 %
NEUTROPHILS # BLD AUTO: 7.5 10E3/UL (ref 1.6–8.3)
NEUTROPHILS NFR BLD AUTO: 75 %
NRBC # BLD AUTO: 0 10E3/UL
NRBC BLD AUTO-RTO: 0 /100
PLAT MORPH BLD: NORMAL
PLATELET # BLD AUTO: 201 10E3/UL (ref 150–450)
PLATELET # BLD AUTO: 203 10E3/UL (ref 150–450)
PLATELET # BLD AUTO: 211 10E3/UL (ref 150–450)
PROTHROMBIN TIME: 12.2 SECONDS (ref 11.8–14.8)
PROTHROMBIN TIME: 12.5 SECONDS (ref 11.8–14.8)
RBC # BLD AUTO: 3.87 10E6/UL (ref 3.8–5.2)
RBC # BLD AUTO: 3.91 10E6/UL (ref 3.8–5.2)
RBC # BLD AUTO: 3.93 10E6/UL (ref 3.8–5.2)
RBC MORPH BLD: NORMAL
SPECIMEN EXP DATE BLD: NORMAL
UNIT ABO/RH: NORMAL
UNIT ABO/RH: NORMAL
UNIT NUMBER: NORMAL
UNIT NUMBER: NORMAL
UNIT STATUS: NORMAL
UNIT STATUS: NORMAL
UNIT TYPE ISBT: 5100
UNIT TYPE ISBT: 5100
WBC # BLD AUTO: 10 10E3/UL (ref 4–11)
WBC # BLD AUTO: 12.1 10E3/UL (ref 4–11)
WBC # BLD AUTO: 12.5 10E3/UL (ref 4–11)

## 2025-06-28 PROCEDURE — 36415 COLL VENOUS BLD VENIPUNCTURE: CPT | Performed by: ADVANCED PRACTICE MIDWIFE

## 2025-06-28 PROCEDURE — 258N000003 HC RX IP 258 OP 636: Performed by: ADVANCED PRACTICE MIDWIFE

## 2025-06-28 PROCEDURE — 370N000017 HC ANESTHESIA TECHNICAL FEE, PER MIN: Performed by: OBSTETRICS & GYNECOLOGY

## 2025-06-28 PROCEDURE — 86923 COMPATIBILITY TEST ELECTRIC: CPT | Performed by: ADVANCED PRACTICE MIDWIFE

## 2025-06-28 PROCEDURE — 250N000011 HC RX IP 250 OP 636: Performed by: ANESTHESIOLOGY

## 2025-06-28 PROCEDURE — 85730 THROMBOPLASTIN TIME PARTIAL: CPT | Performed by: ADVANCED PRACTICE MIDWIFE

## 2025-06-28 PROCEDURE — 120N000002 HC R&B MED SURG/OB UMMC

## 2025-06-28 PROCEDURE — 85027 COMPLETE CBC AUTOMATED: CPT | Performed by: ADVANCED PRACTICE MIDWIFE

## 2025-06-28 PROCEDURE — 250N000009 HC RX 250: Performed by: ADVANCED PRACTICE MIDWIFE

## 2025-06-28 PROCEDURE — 3E033VJ INTRODUCTION OF OTHER HORMONE INTO PERIPHERAL VEIN, PERCUTANEOUS APPROACH: ICD-10-PCS | Performed by: OBSTETRICS & GYNECOLOGY

## 2025-06-28 PROCEDURE — 85610 PROTHROMBIN TIME: CPT | Performed by: ADVANCED PRACTICE MIDWIFE

## 2025-06-28 PROCEDURE — 710N000010 HC RECOVERY PHASE 1, LEVEL 2, PER MIN: Performed by: OBSTETRICS & GYNECOLOGY

## 2025-06-28 PROCEDURE — 250N000011 HC RX IP 250 OP 636: Performed by: ADVANCED PRACTICE MIDWIFE

## 2025-06-28 PROCEDURE — 86780 TREPONEMA PALLIDUM: CPT | Performed by: ADVANCED PRACTICE MIDWIFE

## 2025-06-28 PROCEDURE — G0463 HOSPITAL OUTPT CLINIC VISIT: HCPCS

## 2025-06-28 PROCEDURE — 258N000003 HC RX IP 258 OP 636

## 2025-06-28 PROCEDURE — 85384 FIBRINOGEN ACTIVITY: CPT | Performed by: ADVANCED PRACTICE MIDWIFE

## 2025-06-28 PROCEDURE — 250N000011 HC RX IP 250 OP 636

## 2025-06-28 PROCEDURE — 250N000013 HC RX MED GY IP 250 OP 250 PS 637: Performed by: OBSTETRICS & GYNECOLOGY

## 2025-06-28 PROCEDURE — 272N000001 HC OR GENERAL SUPPLY STERILE: Performed by: OBSTETRICS & GYNECOLOGY

## 2025-06-28 PROCEDURE — 86900 BLOOD TYPING SEROLOGIC ABO: CPT | Performed by: ADVANCED PRACTICE MIDWIFE

## 2025-06-28 PROCEDURE — 999N000141 HC STATISTIC PRE-PROCEDURE NURSING ASSESSMENT: Performed by: OBSTETRICS & GYNECOLOGY

## 2025-06-28 PROCEDURE — 360N000076 HC SURGERY LEVEL 3, PER MIN: Performed by: OBSTETRICS & GYNECOLOGY

## 2025-06-28 PROCEDURE — 85460 HEMOGLOBIN FETAL: CPT | Performed by: ADVANCED PRACTICE MIDWIFE

## 2025-06-28 PROCEDURE — 86762 RUBELLA ANTIBODY: CPT

## 2025-06-28 PROCEDURE — 250N000011 HC RX IP 250 OP 636: Performed by: OBSTETRICS & GYNECOLOGY

## 2025-06-28 PROCEDURE — 271N000001 HC OR GENERAL SUPPLY NON-STERILE: Performed by: OBSTETRICS & GYNECOLOGY

## 2025-06-28 PROCEDURE — 86901 BLOOD TYPING SEROLOGIC RH(D): CPT | Performed by: ADVANCED PRACTICE MIDWIFE

## 2025-06-28 PROCEDURE — 85025 COMPLETE CBC W/AUTO DIFF WBC: CPT

## 2025-06-28 PROCEDURE — 85004 AUTOMATED DIFF WBC COUNT: CPT

## 2025-06-28 PROCEDURE — 250N000013 HC RX MED GY IP 250 OP 250 PS 637: Performed by: ADVANCED PRACTICE MIDWIFE

## 2025-06-28 RX ORDER — IBUPROFEN 800 MG/1
800 TABLET, FILM COATED ORAL
Status: DISCONTINUED | OUTPATIENT
Start: 2025-06-28 | End: 2025-06-29

## 2025-06-28 RX ORDER — LIDOCAINE 40 MG/G
CREAM TOPICAL
Status: DISCONTINUED | OUTPATIENT
Start: 2025-06-28 | End: 2025-06-28 | Stop reason: HOSPADM

## 2025-06-28 RX ORDER — METHYLERGONOVINE MALEATE 0.2 MG/ML
200 INJECTION INTRAVENOUS
Status: DISCONTINUED | OUTPATIENT
Start: 2025-06-28 | End: 2025-06-29

## 2025-06-28 RX ORDER — MISOPROSTOL 200 UG/1
400 TABLET ORAL
Status: DISCONTINUED | OUTPATIENT
Start: 2025-06-28 | End: 2025-06-29

## 2025-06-28 RX ORDER — TRANEXAMIC ACID 10 MG/ML
1 INJECTION, SOLUTION INTRAVENOUS EVERY 30 MIN PRN
Status: DISCONTINUED | OUTPATIENT
Start: 2025-06-28 | End: 2025-06-29

## 2025-06-28 RX ORDER — ONDANSETRON 2 MG/ML
INJECTION INTRAMUSCULAR; INTRAVENOUS PRN
Status: DISCONTINUED | OUTPATIENT
Start: 2025-06-28 | End: 2025-06-29

## 2025-06-28 RX ORDER — METHYLERGONOVINE MALEATE 0.2 MG/ML
200 INJECTION INTRAVENOUS
Status: DISCONTINUED | OUTPATIENT
Start: 2025-06-28 | End: 2025-06-29 | Stop reason: HOSPADM

## 2025-06-28 RX ORDER — CEFAZOLIN SODIUM/WATER 2 G/20 ML
2 SYRINGE (ML) INTRAVENOUS
Status: COMPLETED | OUTPATIENT
Start: 2025-06-28 | End: 2025-06-28

## 2025-06-28 RX ORDER — ACETAMINOPHEN 325 MG/1
975 TABLET ORAL ONCE
Status: COMPLETED | OUTPATIENT
Start: 2025-06-28 | End: 2025-06-28

## 2025-06-28 RX ORDER — OXYTOCIN 10 [USP'U]/ML
10 INJECTION, SOLUTION INTRAMUSCULAR; INTRAVENOUS
Status: DISCONTINUED | OUTPATIENT
Start: 2025-06-28 | End: 2025-06-29 | Stop reason: HOSPADM

## 2025-06-28 RX ORDER — LIDOCAINE 40 MG/G
CREAM TOPICAL
Status: DISCONTINUED | OUTPATIENT
Start: 2025-06-28 | End: 2025-06-29

## 2025-06-28 RX ORDER — LOPERAMIDE HYDROCHLORIDE 2 MG/1
4 CAPSULE ORAL
Status: DISCONTINUED | OUTPATIENT
Start: 2025-06-28 | End: 2025-06-29 | Stop reason: HOSPADM

## 2025-06-28 RX ORDER — METOCLOPRAMIDE HYDROCHLORIDE 5 MG/ML
10 INJECTION INTRAMUSCULAR; INTRAVENOUS EVERY 6 HOURS PRN
Status: DISCONTINUED | OUTPATIENT
Start: 2025-06-28 | End: 2025-06-29

## 2025-06-28 RX ORDER — TRANEXAMIC ACID 10 MG/ML
1 INJECTION, SOLUTION INTRAVENOUS EVERY 30 MIN PRN
Status: DISCONTINUED | OUTPATIENT
Start: 2025-06-28 | End: 2025-06-29 | Stop reason: HOSPADM

## 2025-06-28 RX ORDER — KETOROLAC TROMETHAMINE 15 MG/ML
15 INJECTION, SOLUTION INTRAMUSCULAR; INTRAVENOUS
Status: DISCONTINUED | OUTPATIENT
Start: 2025-06-28 | End: 2025-06-29

## 2025-06-28 RX ORDER — OXYTOCIN/0.9 % SODIUM CHLORIDE 30/500 ML
100-340 PLASTIC BAG, INJECTION (ML) INTRAVENOUS CONTINUOUS PRN
Status: DISCONTINUED | OUTPATIENT
Start: 2025-06-28 | End: 2025-06-29

## 2025-06-28 RX ORDER — ONDANSETRON 4 MG/1
4 TABLET, ORALLY DISINTEGRATING ORAL EVERY 6 HOURS PRN
Status: DISCONTINUED | OUTPATIENT
Start: 2025-06-28 | End: 2025-06-29

## 2025-06-28 RX ORDER — SODIUM CHLORIDE, SODIUM LACTATE, POTASSIUM CHLORIDE, CALCIUM CHLORIDE 600; 310; 30; 20 MG/100ML; MG/100ML; MG/100ML; MG/100ML
INJECTION, SOLUTION INTRAVENOUS CONTINUOUS PRN
Status: DISCONTINUED | OUTPATIENT
Start: 2025-06-28 | End: 2025-06-29

## 2025-06-28 RX ORDER — LEVOTHYROXINE SODIUM 50 UG/1
50 TABLET ORAL
Status: DISCONTINUED | OUTPATIENT
Start: 2025-06-29 | End: 2025-06-29

## 2025-06-28 RX ORDER — CITRIC ACID/SODIUM CITRATE 334-500MG
30 SOLUTION, ORAL ORAL
Status: COMPLETED | OUTPATIENT
Start: 2025-06-28 | End: 2025-06-28

## 2025-06-28 RX ORDER — ACETAMINOPHEN 325 MG/1
650 TABLET ORAL EVERY 4 HOURS PRN
Status: DISCONTINUED | OUTPATIENT
Start: 2025-06-28 | End: 2025-06-29

## 2025-06-28 RX ORDER — FENTANYL CITRATE 50 UG/ML
INJECTION, SOLUTION INTRAMUSCULAR; INTRAVENOUS
Status: COMPLETED | OUTPATIENT
Start: 2025-06-28 | End: 2025-06-28

## 2025-06-28 RX ORDER — OXYTOCIN/0.9 % SODIUM CHLORIDE 30/500 ML
340 PLASTIC BAG, INJECTION (ML) INTRAVENOUS CONTINUOUS PRN
Status: DISCONTINUED | OUTPATIENT
Start: 2025-06-28 | End: 2025-06-29

## 2025-06-28 RX ORDER — LIDOCAINE 40 MG/G
CREAM TOPICAL
Status: DISCONTINUED | OUTPATIENT
Start: 2025-06-28 | End: 2025-06-29 | Stop reason: HOSPADM

## 2025-06-28 RX ORDER — CEFAZOLIN SODIUM/WATER 2 G/20 ML
2 SYRINGE (ML) INTRAVENOUS SEE ADMIN INSTRUCTIONS
Status: DISCONTINUED | OUTPATIENT
Start: 2025-06-28 | End: 2025-06-29 | Stop reason: HOSPADM

## 2025-06-28 RX ORDER — OXYCODONE HYDROCHLORIDE 5 MG/1
5 TABLET ORAL
Refills: 0 | Status: DISCONTINUED | OUTPATIENT
Start: 2025-06-28 | End: 2025-06-29

## 2025-06-28 RX ORDER — MORPHINE SULFATE 1 MG/ML
INJECTION, SOLUTION EPIDURAL; INTRATHECAL; INTRAVENOUS
Status: COMPLETED | OUTPATIENT
Start: 2025-06-28 | End: 2025-06-28

## 2025-06-28 RX ORDER — METOCLOPRAMIDE 10 MG/1
10 TABLET ORAL EVERY 6 HOURS PRN
Status: DISCONTINUED | OUTPATIENT
Start: 2025-06-28 | End: 2025-06-29

## 2025-06-28 RX ORDER — OXYTOCIN/0.9 % SODIUM CHLORIDE 30/500 ML
1-24 PLASTIC BAG, INJECTION (ML) INTRAVENOUS CONTINUOUS
Status: DISCONTINUED | OUTPATIENT
Start: 2025-06-28 | End: 2025-06-29

## 2025-06-28 RX ORDER — MISOPROSTOL 200 UG/1
800 TABLET ORAL
Status: DISCONTINUED | OUTPATIENT
Start: 2025-06-28 | End: 2025-06-29

## 2025-06-28 RX ORDER — NALOXONE HYDROCHLORIDE 0.4 MG/ML
0.2 INJECTION, SOLUTION INTRAMUSCULAR; INTRAVENOUS; SUBCUTANEOUS
Status: DISCONTINUED | OUTPATIENT
Start: 2025-06-28 | End: 2025-06-29

## 2025-06-28 RX ORDER — SODIUM CHLORIDE, SODIUM LACTATE, POTASSIUM CHLORIDE, CALCIUM CHLORIDE 600; 310; 30; 20 MG/100ML; MG/100ML; MG/100ML; MG/100ML
INJECTION, SOLUTION INTRAVENOUS CONTINUOUS
Status: DISCONTINUED | OUTPATIENT
Start: 2025-06-28 | End: 2025-06-29

## 2025-06-28 RX ORDER — TERBUTALINE SULFATE 1 MG/ML
0.25 INJECTION SUBCUTANEOUS
Status: DISCONTINUED | OUTPATIENT
Start: 2025-06-28 | End: 2025-06-29

## 2025-06-28 RX ORDER — OXYTOCIN 10 [USP'U]/ML
10 INJECTION, SOLUTION INTRAMUSCULAR; INTRAVENOUS
Status: DISCONTINUED | OUTPATIENT
Start: 2025-06-28 | End: 2025-06-29

## 2025-06-28 RX ORDER — LOPERAMIDE HYDROCHLORIDE 2 MG/1
2 CAPSULE ORAL
Status: DISCONTINUED | OUTPATIENT
Start: 2025-06-28 | End: 2025-06-29 | Stop reason: HOSPADM

## 2025-06-28 RX ORDER — AZITHROMYCIN 500 MG/5ML
500 INJECTION, POWDER, LYOPHILIZED, FOR SOLUTION INTRAVENOUS
Status: COMPLETED | OUTPATIENT
Start: 2025-06-28 | End: 2025-06-29

## 2025-06-28 RX ORDER — CARBOPROST TROMETHAMINE 250 UG/ML
250 INJECTION, SOLUTION INTRAMUSCULAR
Status: DISCONTINUED | OUTPATIENT
Start: 2025-06-28 | End: 2025-06-29

## 2025-06-28 RX ORDER — MISOPROSTOL 200 UG/1
800 TABLET ORAL
Status: DISCONTINUED | OUTPATIENT
Start: 2025-06-28 | End: 2025-06-29 | Stop reason: HOSPADM

## 2025-06-28 RX ORDER — MISOPROSTOL 200 UG/1
400 TABLET ORAL
Status: DISCONTINUED | OUTPATIENT
Start: 2025-06-28 | End: 2025-06-29 | Stop reason: HOSPADM

## 2025-06-28 RX ORDER — FENTANYL CITRATE 50 UG/ML
100 INJECTION, SOLUTION INTRAMUSCULAR; INTRAVENOUS
Refills: 0 | Status: DISCONTINUED | OUTPATIENT
Start: 2025-06-28 | End: 2025-06-29

## 2025-06-28 RX ORDER — ONDANSETRON 2 MG/ML
4 INJECTION INTRAMUSCULAR; INTRAVENOUS EVERY 6 HOURS PRN
Status: DISCONTINUED | OUTPATIENT
Start: 2025-06-28 | End: 2025-06-29

## 2025-06-28 RX ORDER — NALOXONE HYDROCHLORIDE 0.4 MG/ML
0.4 INJECTION, SOLUTION INTRAMUSCULAR; INTRAVENOUS; SUBCUTANEOUS
Status: DISCONTINUED | OUTPATIENT
Start: 2025-06-28 | End: 2025-06-29

## 2025-06-28 RX ORDER — LOPERAMIDE HYDROCHLORIDE 2 MG/1
4 CAPSULE ORAL
Status: DISCONTINUED | OUTPATIENT
Start: 2025-06-28 | End: 2025-06-29

## 2025-06-28 RX ORDER — LOPERAMIDE HYDROCHLORIDE 2 MG/1
2 CAPSULE ORAL
Status: DISCONTINUED | OUTPATIENT
Start: 2025-06-28 | End: 2025-06-29

## 2025-06-28 RX ORDER — CARBOPROST TROMETHAMINE 250 UG/ML
250 INJECTION, SOLUTION INTRAMUSCULAR
Status: DISCONTINUED | OUTPATIENT
Start: 2025-06-28 | End: 2025-06-29 | Stop reason: HOSPADM

## 2025-06-28 RX ORDER — OXYTOCIN/0.9 % SODIUM CHLORIDE 30/500 ML
340 PLASTIC BAG, INJECTION (ML) INTRAVENOUS CONTINUOUS PRN
Status: DISCONTINUED | OUTPATIENT
Start: 2025-06-28 | End: 2025-06-29 | Stop reason: HOSPADM

## 2025-06-28 RX ORDER — SODIUM CHLORIDE, SODIUM LACTATE, POTASSIUM CHLORIDE, CALCIUM CHLORIDE 600; 310; 30; 20 MG/100ML; MG/100ML; MG/100ML; MG/100ML
INJECTION, SOLUTION INTRAVENOUS CONTINUOUS
Status: DISCONTINUED | OUTPATIENT
Start: 2025-06-28 | End: 2025-06-29 | Stop reason: HOSPADM

## 2025-06-28 RX ORDER — CITRIC ACID/SODIUM CITRATE 334-500MG
30 SOLUTION, ORAL ORAL
Status: DISCONTINUED | OUTPATIENT
Start: 2025-06-28 | End: 2025-06-29

## 2025-06-28 RX ORDER — PROCHLORPERAZINE MALEATE 10 MG
10 TABLET ORAL EVERY 6 HOURS PRN
Status: DISCONTINUED | OUTPATIENT
Start: 2025-06-28 | End: 2025-06-29

## 2025-06-28 RX ORDER — BUPIVACAINE HYDROCHLORIDE 7.5 MG/ML
INJECTION, SOLUTION INTRASPINAL
Status: COMPLETED | OUTPATIENT
Start: 2025-06-28 | End: 2025-06-28

## 2025-06-28 RX ADMIN — Medication 2 G: at 23:46

## 2025-06-28 RX ADMIN — FENTANYL CITRATE 15 MCG: 50 INJECTION INTRAMUSCULAR; INTRAVENOUS at 23:40

## 2025-06-28 RX ADMIN — METOCLOPRAMIDE 10 MG: 10 TABLET ORAL at 21:59

## 2025-06-28 RX ADMIN — SODIUM CHLORIDE, SODIUM LACTATE, POTASSIUM CHLORIDE, AND CALCIUM CHLORIDE: .6; .31; .03; .02 INJECTION, SOLUTION INTRAVENOUS at 23:15

## 2025-06-28 RX ADMIN — PHENYLEPHRINE HYDROCHLORIDE 100 MCG: 10 INJECTION INTRAVENOUS at 23:45

## 2025-06-28 RX ADMIN — BUPIVACAINE HYDROCHLORIDE IN DEXTROSE 1.4 ML: 7.5 INJECTION, SOLUTION SUBARACHNOID at 23:40

## 2025-06-28 RX ADMIN — FAMOTIDINE 20 MG: 10 INJECTION, SOLUTION INTRAVENOUS at 23:48

## 2025-06-28 RX ADMIN — PHENYLEPHRINE HYDROCHLORIDE 100 MCG/MIN: 10 INJECTION INTRAVENOUS at 23:43

## 2025-06-28 RX ADMIN — Medication 2 MILLI-UNITS/MIN: at 19:58

## 2025-06-28 RX ADMIN — SODIUM CITRATE AND CITRIC ACID MONOHYDRATE 30 ML: 500; 334 SOLUTION ORAL at 23:31

## 2025-06-28 RX ADMIN — MORPHINE SULFATE 0.15 MG: 1 INJECTION EPIDURAL; INTRATHECAL; INTRAVENOUS at 23:40

## 2025-06-28 RX ADMIN — ONDANSETRON 4 MG: 2 INJECTION INTRAMUSCULAR; INTRAVENOUS at 22:47

## 2025-06-28 RX ADMIN — Medication 500 MG: at 23:46

## 2025-06-28 RX ADMIN — ONDANSETRON 4 MG: 2 INJECTION INTRAMUSCULAR; INTRAVENOUS at 23:45

## 2025-06-28 RX ADMIN — SODIUM CHLORIDE, SODIUM LACTATE, POTASSIUM CHLORIDE, AND CALCIUM CHLORIDE: .6; .31; .03; .02 INJECTION, SOLUTION INTRAVENOUS at 19:53

## 2025-06-28 RX ADMIN — ACETAMINOPHEN 975 MG: 325 TABLET ORAL at 23:24

## 2025-06-28 ASSESSMENT — ACTIVITIES OF DAILY LIVING (ADL)
ADLS_ACUITY_SCORE: 43
ADLS_ACUITY_SCORE: 17
ADLS_ACUITY_SCORE: 43
ADLS_ACUITY_SCORE: 17
ADLS_ACUITY_SCORE: 43
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
## 2025-06-28 NOTE — CARE PLAN
Data: [facility] to [facility]: 2025 at 1:05 PM.  Reason for maternal/fetal assessment is uterine contractions and vaginal bleeding. Patient reports uterine contractions started at noon today, every 5-10 minutes, and pt had a small amount of spotting with wiping, both yesterday and today. Pt reports she was seen in clinic yesterday due to vaginal spotting , and SVE was performed at that time. Patient reports everything looked normal at the visit, per her Provider. Patient denies leaking of vaginal fluid/rupture of membranes, pelvic pressure, nausea, vomiting, headache, visual disturbances, epigastric or RUQ pain, significant edema. Patient reports fetal movement is normal. Patient is a 38w1d .  Prenatal record reviewed.     Vital signs wnl. Support person is not present, and pt drove herself here..     Action: Verbal consent for EFM. Triage assessment completed.     Response: Patient verbalized agreement with plan. Nicole Hicks CNM, in Dept and notified of above, plan to update Amisha's Resident MD and will evaluate patient shortly.

## 2025-06-28 NOTE — PROGRESS NOTES
VSS, patient states is coping with moderate contractions, see flow sheet for FHR and contraction pattern. Nicole DUNNM performed: SVE:1/50/med/posterior, plan to re-check around 5 pm or sooner if indicated. Will continue to monitor and will notify provider is there is a change in status.
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
## 2025-06-29 ENCOUNTER — APPOINTMENT (OUTPATIENT)
Dept: INTERPRETER SERVICES | Facility: CLINIC | Age: 45
End: 2025-06-29
Payer: COMMERCIAL

## 2025-06-29 PROBLEM — Z36.89 ENCOUNTER FOR TRIAGE IN PREGNANT PATIENT: Status: RESOLVED | Noted: 2025-05-19 | Resolved: 2025-06-29

## 2025-06-29 PROBLEM — N12 PYELONEPHRITIS: Status: RESOLVED | Noted: 2025-02-13 | Resolved: 2025-06-29

## 2025-06-29 LAB
HGB BLD-MCNC: 10.2 G/DL (ref 11.7–15.7)
MCV RBC AUTO: 97 FL (ref 78–100)
T PALLIDUM AB SER QL: NONREACTIVE

## 2025-06-29 PROCEDURE — 88307 TISSUE EXAM BY PATHOLOGIST: CPT | Mod: 26 | Performed by: STUDENT IN AN ORGANIZED HEALTH CARE EDUCATION/TRAINING PROGRAM

## 2025-06-29 PROCEDURE — 999N000016 HC STATISTIC ATTENDANCE AT DELIVERY

## 2025-06-29 PROCEDURE — 120N000002 HC R&B MED SURG/OB UMMC

## 2025-06-29 PROCEDURE — 250N000009 HC RX 250: Performed by: OBSTETRICS & GYNECOLOGY

## 2025-06-29 PROCEDURE — 36415 COLL VENOUS BLD VENIPUNCTURE: CPT

## 2025-06-29 PROCEDURE — 250N000013 HC RX MED GY IP 250 OP 250 PS 637

## 2025-06-29 PROCEDURE — 88307 TISSUE EXAM BY PATHOLOGIST: CPT | Mod: TC

## 2025-06-29 PROCEDURE — 250N000011 HC RX IP 250 OP 636

## 2025-06-29 PROCEDURE — 85018 HEMOGLOBIN: CPT

## 2025-06-29 PROCEDURE — 250N000009 HC RX 250

## 2025-06-29 PROCEDURE — 59514 CESAREAN DELIVERY ONLY: CPT | Mod: GC | Performed by: OBSTETRICS & GYNECOLOGY

## 2025-06-29 PROCEDURE — 250N000011 HC RX IP 250 OP 636: Performed by: ANESTHESIOLOGY

## 2025-06-29 RX ORDER — OXYTOCIN/0.9 % SODIUM CHLORIDE 30/500 ML
340 PLASTIC BAG, INJECTION (ML) INTRAVENOUS CONTINUOUS PRN
Status: DISCONTINUED | OUTPATIENT
Start: 2025-06-29 | End: 2025-07-01 | Stop reason: HOSPADM

## 2025-06-29 RX ORDER — POLYETHYLENE GLYCOL 3350 17 G/17G
17 POWDER, FOR SOLUTION ORAL DAILY
Status: DISCONTINUED | OUTPATIENT
Start: 2025-06-29 | End: 2025-07-01 | Stop reason: HOSPADM

## 2025-06-29 RX ORDER — NALOXONE HYDROCHLORIDE 0.4 MG/ML
0.2 INJECTION, SOLUTION INTRAMUSCULAR; INTRAVENOUS; SUBCUTANEOUS
Status: DISCONTINUED | OUTPATIENT
Start: 2025-06-29 | End: 2025-07-01 | Stop reason: HOSPADM

## 2025-06-29 RX ORDER — OXYTOCIN/0.9 % SODIUM CHLORIDE 30/500 ML
PLASTIC BAG, INJECTION (ML) INTRAVENOUS CONTINUOUS PRN
Status: DISCONTINUED | OUTPATIENT
Start: 2025-06-29 | End: 2025-06-29

## 2025-06-29 RX ORDER — METOCLOPRAMIDE 10 MG/1
10 TABLET ORAL EVERY 6 HOURS PRN
Status: DISCONTINUED | OUTPATIENT
Start: 2025-06-29 | End: 2025-07-01 | Stop reason: HOSPADM

## 2025-06-29 RX ORDER — CYCLOBENZAPRINE HCL 5 MG
10 TABLET ORAL 3 TIMES DAILY
Status: DISCONTINUED | OUTPATIENT
Start: 2025-06-29 | End: 2025-07-01 | Stop reason: HOSPADM

## 2025-06-29 RX ORDER — AMOXICILLIN 250 MG
2 CAPSULE ORAL 2 TIMES DAILY
Status: DISCONTINUED | OUTPATIENT
Start: 2025-06-29 | End: 2025-07-01 | Stop reason: HOSPADM

## 2025-06-29 RX ORDER — ACETAMINOPHEN 325 MG/1
975 TABLET ORAL EVERY 6 HOURS
Status: DISCONTINUED | OUTPATIENT
Start: 2025-06-29 | End: 2025-07-01 | Stop reason: HOSPADM

## 2025-06-29 RX ORDER — KETOROLAC TROMETHAMINE 30 MG/ML
INJECTION, SOLUTION INTRAMUSCULAR; INTRAVENOUS PRN
Status: DISCONTINUED | OUTPATIENT
Start: 2025-06-29 | End: 2025-06-29

## 2025-06-29 RX ORDER — TRANEXAMIC ACID 10 MG/ML
1 INJECTION, SOLUTION INTRAVENOUS EVERY 30 MIN PRN
Status: DISCONTINUED | OUTPATIENT
Start: 2025-06-29 | End: 2025-07-01 | Stop reason: HOSPADM

## 2025-06-29 RX ORDER — OXYCODONE HYDROCHLORIDE 5 MG/1
5 TABLET ORAL EVERY 4 HOURS PRN
Status: DISCONTINUED | OUTPATIENT
Start: 2025-06-29 | End: 2025-07-01 | Stop reason: HOSPADM

## 2025-06-29 RX ORDER — BUPIVACAINE HYDROCHLORIDE 2.5 MG/ML
INJECTION, SOLUTION EPIDURAL; INFILTRATION; INTRACAUDAL; PERINEURAL
Status: COMPLETED | OUTPATIENT
Start: 2025-06-29 | End: 2025-06-29

## 2025-06-29 RX ORDER — SIMETHICONE 80 MG
80 TABLET,CHEWABLE ORAL EVERY 6 HOURS PRN
Status: DISCONTINUED | OUTPATIENT
Start: 2025-06-29 | End: 2025-07-01 | Stop reason: HOSPADM

## 2025-06-29 RX ORDER — KETOROLAC TROMETHAMINE 15 MG/ML
15 INJECTION, SOLUTION INTRAMUSCULAR; INTRAVENOUS EVERY 6 HOURS
Status: COMPLETED | OUTPATIENT
Start: 2025-06-29 | End: 2025-06-29

## 2025-06-29 RX ORDER — HYDROCORTISONE 25 MG/G
CREAM TOPICAL 3 TIMES DAILY PRN
Status: DISCONTINUED | OUTPATIENT
Start: 2025-06-29 | End: 2025-07-01 | Stop reason: HOSPADM

## 2025-06-29 RX ORDER — NALOXONE HYDROCHLORIDE 0.4 MG/ML
0.4 INJECTION, SOLUTION INTRAMUSCULAR; INTRAVENOUS; SUBCUTANEOUS
Status: DISCONTINUED | OUTPATIENT
Start: 2025-06-29 | End: 2025-07-01 | Stop reason: HOSPADM

## 2025-06-29 RX ORDER — ONDANSETRON 2 MG/ML
4 INJECTION INTRAMUSCULAR; INTRAVENOUS EVERY 6 HOURS PRN
Status: DISCONTINUED | OUTPATIENT
Start: 2025-06-29 | End: 2025-07-01 | Stop reason: HOSPADM

## 2025-06-29 RX ORDER — METHYLERGONOVINE MALEATE 0.2 MG/ML
200 INJECTION INTRAVENOUS
Status: DISCONTINUED | OUTPATIENT
Start: 2025-06-29 | End: 2025-07-01 | Stop reason: HOSPADM

## 2025-06-29 RX ORDER — ONDANSETRON 4 MG/1
4 TABLET, ORALLY DISINTEGRATING ORAL EVERY 6 HOURS PRN
Status: DISCONTINUED | OUTPATIENT
Start: 2025-06-29 | End: 2025-07-01 | Stop reason: HOSPADM

## 2025-06-29 RX ORDER — CARBOPROST TROMETHAMINE 250 UG/ML
250 INJECTION, SOLUTION INTRAMUSCULAR
Status: DISCONTINUED | OUTPATIENT
Start: 2025-06-29 | End: 2025-07-01 | Stop reason: HOSPADM

## 2025-06-29 RX ORDER — LIDOCAINE 40 MG/G
CREAM TOPICAL
Status: DISCONTINUED | OUTPATIENT
Start: 2025-06-29 | End: 2025-07-01 | Stop reason: HOSPADM

## 2025-06-29 RX ORDER — LOPERAMIDE HYDROCHLORIDE 2 MG/1
2 CAPSULE ORAL
Status: DISCONTINUED | OUTPATIENT
Start: 2025-06-29 | End: 2025-07-01 | Stop reason: HOSPADM

## 2025-06-29 RX ORDER — IBUPROFEN 800 MG/1
800 TABLET, FILM COATED ORAL EVERY 6 HOURS
Status: DISCONTINUED | OUTPATIENT
Start: 2025-06-29 | End: 2025-07-01 | Stop reason: HOSPADM

## 2025-06-29 RX ORDER — SODIUM PHOSPHATE,MONO-DIBASIC 19G-7G/118
1 ENEMA (ML) RECTAL DAILY PRN
Status: DISCONTINUED | OUTPATIENT
Start: 2025-07-01 | End: 2025-07-01 | Stop reason: HOSPADM

## 2025-06-29 RX ORDER — OXYTOCIN 10 [USP'U]/ML
10 INJECTION, SOLUTION INTRAMUSCULAR; INTRAVENOUS
Status: DISCONTINUED | OUTPATIENT
Start: 2025-06-29 | End: 2025-07-01 | Stop reason: HOSPADM

## 2025-06-29 RX ORDER — OXYCODONE HYDROCHLORIDE 5 MG/1
5 TABLET ORAL ONCE
Refills: 0 | Status: COMPLETED | OUTPATIENT
Start: 2025-06-29 | End: 2025-06-29

## 2025-06-29 RX ORDER — MAGNESIUM HYDROXIDE 1200 MG/15ML
LIQUID ORAL PRN
Status: DISCONTINUED | OUTPATIENT
Start: 2025-06-29 | End: 2025-07-01 | Stop reason: HOSPADM

## 2025-06-29 RX ORDER — MISOPROSTOL 200 UG/1
400 TABLET ORAL
Status: DISCONTINUED | OUTPATIENT
Start: 2025-06-29 | End: 2025-07-01 | Stop reason: HOSPADM

## 2025-06-29 RX ORDER — METOCLOPRAMIDE HYDROCHLORIDE 5 MG/ML
10 INJECTION INTRAMUSCULAR; INTRAVENOUS EVERY 6 HOURS PRN
Status: DISCONTINUED | OUTPATIENT
Start: 2025-06-29 | End: 2025-07-01 | Stop reason: HOSPADM

## 2025-06-29 RX ORDER — BISACODYL 10 MG
10 SUPPOSITORY, RECTAL RECTAL DAILY PRN
Status: DISCONTINUED | OUTPATIENT
Start: 2025-07-01 | End: 2025-07-01 | Stop reason: HOSPADM

## 2025-06-29 RX ORDER — LOPERAMIDE HYDROCHLORIDE 2 MG/1
4 CAPSULE ORAL
Status: DISCONTINUED | OUTPATIENT
Start: 2025-06-29 | End: 2025-07-01 | Stop reason: HOSPADM

## 2025-06-29 RX ORDER — PROCHLORPERAZINE MALEATE 10 MG
10 TABLET ORAL EVERY 6 HOURS PRN
Status: DISCONTINUED | OUTPATIENT
Start: 2025-06-29 | End: 2025-07-01 | Stop reason: HOSPADM

## 2025-06-29 RX ORDER — DIPHENHYDRAMINE HYDROCHLORIDE 50 MG/ML
25 INJECTION, SOLUTION INTRAMUSCULAR; INTRAVENOUS EVERY 6 HOURS PRN
Status: DISCONTINUED | OUTPATIENT
Start: 2025-06-29 | End: 2025-07-01 | Stop reason: HOSPADM

## 2025-06-29 RX ORDER — MISOPROSTOL 200 UG/1
800 TABLET ORAL
Status: DISCONTINUED | OUTPATIENT
Start: 2025-06-29 | End: 2025-07-01 | Stop reason: HOSPADM

## 2025-06-29 RX ORDER — AMOXICILLIN 250 MG
1 CAPSULE ORAL 2 TIMES DAILY
Status: DISCONTINUED | OUTPATIENT
Start: 2025-06-29 | End: 2025-07-01 | Stop reason: HOSPADM

## 2025-06-29 RX ORDER — DEXTROSE, SODIUM CHLORIDE, SODIUM LACTATE, POTASSIUM CHLORIDE, AND CALCIUM CHLORIDE 5; .6; .31; .03; .02 G/100ML; G/100ML; G/100ML; G/100ML; G/100ML
INJECTION, SOLUTION INTRAVENOUS CONTINUOUS
Status: DISCONTINUED | OUTPATIENT
Start: 2025-06-29 | End: 2025-07-01 | Stop reason: HOSPADM

## 2025-06-29 RX ORDER — DIPHENHYDRAMINE HCL 25 MG
25 CAPSULE ORAL EVERY 6 HOURS PRN
Status: DISCONTINUED | OUTPATIENT
Start: 2025-06-29 | End: 2025-07-01 | Stop reason: HOSPADM

## 2025-06-29 RX ADMIN — POLYETHYLENE GLYCOL 3350 17 G: 17 POWDER, FOR SOLUTION ORAL at 08:29

## 2025-06-29 RX ADMIN — Medication 300 ML/HR: at 00:03

## 2025-06-29 RX ADMIN — IBUPROFEN 800 MG: 800 TABLET, FILM COATED ORAL at 18:47

## 2025-06-29 RX ADMIN — OXYCODONE HYDROCHLORIDE 5 MG: 5 TABLET ORAL at 23:07

## 2025-06-29 RX ADMIN — BUPIVACAINE 20 ML: 13.3 INJECTION, SUSPENSION, LIPOSOMAL INFILTRATION at 00:36

## 2025-06-29 RX ADMIN — ACETAMINOPHEN 975 MG: 325 TABLET ORAL at 06:12

## 2025-06-29 RX ADMIN — ACETAMINOPHEN 975 MG: 325 TABLET ORAL at 11:55

## 2025-06-29 RX ADMIN — BUPIVACAINE HYDROCHLORIDE 20 ML: 2.5 INJECTION, SOLUTION EPIDURAL; INFILTRATION; INTRACAUDAL at 00:36

## 2025-06-29 RX ADMIN — ACETAMINOPHEN 975 MG: 325 TABLET ORAL at 18:34

## 2025-06-29 RX ADMIN — OXYCODONE HYDROCHLORIDE 5 MG: 5 TABLET ORAL at 20:52

## 2025-06-29 RX ADMIN — DOCUSATE SODIUM AND SENNOSIDES 2 TABLET: 8.6; 5 TABLET, FILM COATED ORAL at 08:29

## 2025-06-29 RX ADMIN — KETOROLAC TROMETHAMINE 15 MG: 15 INJECTION, SOLUTION INTRAMUSCULAR; INTRAVENOUS at 06:12

## 2025-06-29 RX ADMIN — KETOROLAC TROMETHAMINE 15 MG: 15 INJECTION, SOLUTION INTRAMUSCULAR; INTRAVENOUS at 11:53

## 2025-06-29 RX ADMIN — KETOROLAC TROMETHAMINE 30 MG: 30 INJECTION, SOLUTION INTRAMUSCULAR at 00:21

## 2025-06-29 RX ADMIN — CYCLOBENZAPRINE HYDROCHLORIDE 10 MG: 5 TABLET, FILM COATED ORAL at 23:07

## 2025-06-29 RX ADMIN — DOCUSATE SODIUM AND SENNOSIDES 2 TABLET: 8.6; 5 TABLET, FILM COATED ORAL at 20:52

## 2025-06-29 ASSESSMENT — ACTIVITIES OF DAILY LIVING (ADL)
ADLS_ACUITY_SCORE: 28
ADLS_ACUITY_SCORE: 23
ADLS_ACUITY_SCORE: 17
ADLS_ACUITY_SCORE: 23
ADLS_ACUITY_SCORE: 23
ADLS_ACUITY_SCORE: 28
ADLS_ACUITY_SCORE: 28
ADLS_ACUITY_SCORE: 23
ADLS_ACUITY_SCORE: 23
ADLS_ACUITY_SCORE: 22
ADLS_ACUITY_SCORE: 22
ADLS_ACUITY_SCORE: 23
ADLS_ACUITY_SCORE: 17
ADLS_ACUITY_SCORE: 22
ADLS_ACUITY_SCORE: 23
ADLS_ACUITY_SCORE: 22
ADLS_ACUITY_SCORE: 23
ADLS_ACUITY_SCORE: 22
ADLS_ACUITY_SCORE: 17

## 2025-06-29 NOTE — PROGRESS NOTES
Patient arrived to [facility] unit via zoom cart at 0245,with belongings, accompanied by family, with infant in arms. Received report from BOBO Dolan and checked bands. Unit and room orientation completd. Call light given; no concerns present at this time. Continue with plan of care.

## 2025-06-29 NOTE — OP NOTE
[facility]   OPERATIVE NOTE:  SECTION     Surgery Date:  2025  Surgeon(s): Susi Diop MD  Assistants:  Ev Cardenas MD, PGY3    Preoperative Diagnoses:  -  at 38w2d  - Vaginal bleeding, c/f placental abruption  - category II FHT RFD  - BRITNEY  - Fetal VSD  - H/o TB     Postoperative diagnoses:  -  at 38w2d, now delivered  - small placental abruption     Procedure performed:  Primary low segment transverse  section via pfannenstiel skin incision with single layer uterine closure    Anesthesia:  Spinal with duramorph  Est Blood Loss (mL): 282 mL  Fluid replacement: 500 mL crystalloid.   UOP: 100 ml   Specimens: placenta, gases   Complications: None      Operative findings:   1. Single, viable male infant at 0002 hours on 2025. Apgars of 9 and 9 at one and five minutes.  Birth weight: 2570 g.  Fetal presentation: vertex. Amniotic fluid: clear, blood tinged.    2. Gases below   3. Placenta intact with 3 vessel cord. Small retroplacental clot adherent to posterior uterine wall, consistent with placental abruption    4. Normal appearing uterus, fallopian tubes, ovaries.   5.  No intraabdominal adhesions.  No abdominal wall adhesions   Latest Reference Range & Units 25 00:11   Ph Cord Arterial 7.16 - 7.39  7.15 (LL)   PCO2 Cord Arterial 35 - 71 mm Hg 55   PO2 Cord Arterial 10 - 33 mm Hg 24   Bicarbonate Cord Arterial 16 - 24 mmol/L 19   Base Excess/Deficit >-10.0 - -2.0 mmol/L -10.3 (LL)   Ph Cord Blood Venous 7.21 - 7.45  7.19 (L)   PCO2 Cord Venous 27 - 57 mm Hg 56   PO2 Cord Venous 21 - 37 mm Hg 14 (L)   Bicarbonate Cord Venous 16 - 24 mmol/L 21   Base Excess/Deficit Cord Venous >-10.0 - -2.0 mmol/L -8.0   (LL): Data is critically low  (L): Data is abnormally low    Indication: Sofia Rodriguez is a 44 year old, , who was admitted  to CNM team at 38w2d for IOL iso vaginal bleeding. Patient developed category II tracing remote from  delivery with ongoing vaginal bleeding, and recommended proceeding with urgent . The risks, benefits, and alternatives of  delivery were explained and the patient agreed to proceed.     Procedure details:  After obtaining informed consent with assistance of , the patient was taken to the operating room. She received ancef and azithromycin prior to the skin incision. She was placed in the dorsal supine position with a leftward tilt and prepped and draped in the usual sterile fashion.     Following test of adequate spinal anesthesia, the abdomen was entered through a pfannenstiel  skin incision. The skin incision was made sharply and carried through the subcutaneous tissue to the fascia.  Fascia was incised in the midline and dissected from the underlying muscle with blunt dissecton. The muscle was  in the midline.      The peritoneum was entered bluntly and the opening extended by digital dissection with care to avoid the bladder. A bladder blade was placed. The lower segment of the uterus was opened sharply in a transverse fashion and extended with digital pressure. The infant's head was noted to be in the vertex position. It was elevated to the level of the hysterotomy and was delivered atraumatically, shoulders delivered easily thereafter. The cord was doubly clamped after 60 seconds and cut and the infant was handed off to the waiting NICU staff. A segment of the cord was cut and set aside for cord gases.     The placenta was expressed.  The uterus was exteriorized from the abdomen and cleared of all clots and debris.  The uterus was massaged and was noted to be firm.  Pitocin was given through the running IV.  With vigorous massage as well as administration of pitocin, good uterine tone was achieved. The hysterotomy was repaired with 0-vicryl suture in a running locked fashion. The posterior cul-de-sac was cleared of clot and debris and the uterus was returned to the  abdomen.      The bilateral pericolic gutters were cleared of clot and debris.  The hysterotomy was again inspected and found to be hemostatic.  The abdominal wall was examined and also found to be hemostatic.  The fascia was closed with a running suture of 0-Vicryl.  Subcutaneous tissue was irrigated. Areas that were not hemostatic were controlled with cautery. The skin was closed with 4-0 monocryl. The patient tolerated the procedure well and was taken to the recovery room in stable condition. All sponge, needle and instrument counts were correct x2.     Dr. Diop was present for the entire procedure.     Ev Cardenas MD  Obstetrics and Gyncology, PGY-3  June 29, 2025 , 12:43 AM      I was present and scrubbed throughout the procedure,  I agree with the note above  Susi Diop MD

## 2025-06-29 NOTE — PLAN OF CARE
Goal Outcome Evaluation:      Plan of Care Reviewed With: patient    Problem: Postpartum ( Delivery)  Goal: Optimal Pain Control and Function  Outcome: Progressing     Problem: Postpartum ( Delivery)  Goal: Fluid and Electrolyte Balance  Outcome: Progressing     Problem: Postpartum ( Delivery)  Goal: Effective Bowel Elimination  Outcome: Progressing     Overall Patient Progress: improvingOverall Patient Progress: improving    Outcome Evaluation: Patient is stable, moving in the room with minimal pain at 2/10. Denies any light headedness or dizziness. She's voiding and passing gas. Bonding well with baby, breastfeeding and supplementing with formula. Will continue with plan of care.

## 2025-06-29 NOTE — LACTATION NOTE
This note was copied from a baby's chart.  Consult for:  parent request, SGA    Infant Name: undecided    Infant's Primary Care Clinic: [facility]    Delivery Information:  baby was born at Gestational Age: 38w2d via   delivery on 2025 12:02 AM     Breastfeeding goal (if known): plan to exclusively breastfeed    Maternal Health History:    Information for the patient's mother:  Sofia Wong [[id number]]     Past Medical History:   Diagnosis Date    Anemia, iron deficiency     Anxiety     Cystocele     Fibroid uterus     Gastric ulcer due to Helicobacter pylori, acute     Hypothyroidism     PTSD (post-traumatic stress disorder)     Pyelonephritis     and   Information for the patient's mother:  Sofia Wong [[id number]]     Medications Prior to Admission   Medication Sig Dispense Refill Last Dose/Taking    aspirin 81 MG EC tablet Take 1 tablet by mouth daily.   2025 at  8:00 AM    levothyroxine (SYNTHROID/LEVOTHROID) 50 MCG tablet Take 50 mcg by mouth every morning (before breakfast).   2025 at  8:00 AM    nitroFURantoin macrocrystal-monohydrate (MACROBID) 100 MG capsule Take 1 capsule (100 mg) by mouth daily. 90 capsule 1 Unknown    Prenatal Vit-Fe Fumarate-FA (PRENATAL MULTIVITAMIN W/IRON) 27-0.8 MG tablet Take 1 tablet by mouth daily.   Unknown    vitamin D3 (CHOLECALCIFEROL) 125 MCG (5000 UT) tablet Take 1 tablet by mouth daily.   Unknown         Maternal Breast Exam:  Sofia noted breast growth and sensitivity in early pregnancy. She denies any history of breast/chest injury or surgery. Her breasts are soft and symmetrical with bilateral intact, her left nipple is dimpled at tip and her right nipple is everted. She has been able to hand express colostrum. ?    Breastfeeding/ Lactation History:  older children, last one was in .  Unclear about supply, but possibly she had a low supply     Infant information: baby was SGA at birth and has  age appropriate output and weight loss.      Weight Change Since Birth: <24 hours since delivery at time of consult     Oral exam of baby:  nothing abnormal noted by pediatrician.  Baby was actively working on latching during the consult so this eval was not done at this time.    Feeding History: Breastfeeding, easier on one side than the other due to dimpled nipple.  Supplementing with bottles of formula due to SGA.  Baby is being fed every 3 hours.    Feeding Assessment:  Baby was struggling to latch on the left breast, so a 20mm nipple shield was introduces.  Sofia was taught how to use and care for the shield.  After it was placed on the nipple, baby was able to latch and sustained the latch for the duration of our visit.  Some assistance was given with positioning (placing pillows) and Sofia was encouraged to switch to a cross-cradle hold rather than holding the baby's head in her hand from over the top, but she declined to reposition.    Education:   [] Expected  feeding patterns in the first few days (pg. 38 of Your Guide to To Postpartum and  Care)/ the Second Night  [x] Stages of milk production  [] Benefits of hand expression of colostrum  [] Early feeding cues     [] Benefits of feeding on cue  [] Benefits of skin to skin  [x] Breastfeeding positions  [x] Tips to get and maintain a deep latch  [] Nutritive vs.non-nutritive sucking  [] Gentle breast compressions as needed to enhance milk transfer  [] How to tell when baby is finished  [] How to tell if baby is getting enough  [] Expected  output  [x] Ashley weight loss  [] Infant Feeding Log  [] [facility] Breastfeeding/Pumping videos  [x] Signs breastfeeding is going well (comfortable latch, audible swallows, age appropriate output and weight loss)    [] Tips to prevent engorgement  [] Signs of engorgement  [] Tips to manage engorgement  [] Pumping recommendations (based on patient need)  [] [facility] breast pump part/infant feeding  supplies cleaning recommendations  [x] Inpatient breastfeeding support  [] Outpatient lactation resources    Home Breast Pump: Sofia has a pump at home, received through insurance, but she is unsure what type it is    Plan: Continue breastfeeding on cue with RN support as needed, goal of 8-12 feedings per day.  Use nipple shield on the left breast due to dimpling/inversion.  Supplement with formula/MBM to decrease risk of too much weight loss.      Encourage frequent skin to skin and hand expression.       Grace Orr, RN, IBCLC   Lactation Consultant  [facility]: [facility] [phone]  Office: [phone]

## 2025-06-29 NOTE — PROGRESS NOTES
Data: Sofia Rodriguez transferred to 7125 via Cart at 0240. Baby transferred via parent's arms.  Action: Receiving unit notified of transfer: Yes. Patient and family notified of room change. Report given to Casi CRAIN  at 0229. Belongings sent to receiving unit. Accompanied by Registered Nurse. Oriented patient to surroundings. Call light within reach. ID bands double-checked with receiving RN.  Response: Patient tolerated transfer and is stable.

## 2025-06-29 NOTE — ANESTHESIA CARE TRANSFER NOTE
Patient: Sofia Rodriguez    Procedure: Procedure(s):   section       Diagnosis: * No pre-op diagnosis entered *  Diagnosis Additional Information: No value filed.    Anesthesia Type:   No value filed.     Note:    Oropharynx: oropharynx clear of all foreign objects and spontaneously breathing  Level of Consciousness: awake  Oxygen Supplementation: room air    Independent Airway: airway patency satisfactory and stable  Dentition: dentition unchanged  Vital Signs Stable: post-procedure vital signs reviewed and stable  Report to RN Given: handoff report given  Patient transferred to: Labor and Delivery    Handoff Report: Identifed the Patient, Identified the Reponsible Provider, Reviewed the pertinent medical history, Discussed the surgical course, Reviewed Intra-OP anesthesia mangement and issues during anesthesia, Set expectations for post-procedure period and Allowed opportunity for questions and acknowledgement of understanding      Vitals:  Vitals Value Taken Time   BP     Temp     Pulse 65 25 00:48   Resp 11 25 00:48   SpO2 98 % 25 00:48   Vitals shown include unfiled device data.    Electronically Signed By: Melanie Hensley DO  2025  12:49 AM

## 2025-06-29 NOTE — PROVIDER NOTIFICATION
06/28/25 2306   Provider Notification   Provider Name/Title Dr. Cardenas   Method of Notification At Bedside   Request Evaluate in Person     Provider at bedside, provider recommending c/s delivery, pt agreeable to plan of care. Provider consenting patient, preoperative cares started. Plan for urgent primary c/s delivery. Report given to JOSÉ Sommers RN at 1060.

## 2025-06-29 NOTE — CONSULTS
OB Consult     Consulted by CNM team for evaluation of cat II tracing. Briefly, patient is a 45 yo  who presented with vaginal bleeding and painful contractions. She was admitted for IOL, started on pitocin, and has continued to have ongoing bleeding. Coags on admit were wnl. Currently, patient endorses more constant lower abdominal pain, rates as an 8/10, this is different than her contraction pain. She denies feeling light headed or dizzy, is feeling intermittently nauseous. SVE performed per CNM  1.5//-1, minimal change over last 6 hours, was on pitocin 4mU, this was discontinued. , largely minimal variability, periods of moderate, accels present, no decels. [place] 3-4 in 10. Maternal vital signs wnl. Discussed with patient that our concern with vaginal bleeding, category II tracing, and constant abdominal pain is a potential placental abruption, where the placenta can separate from walls of the uterus. Given SVE above, patient is very remote from delivery at this time, and recommend urgent primary c/s. Discussed risks of c/s including bleeding, infection, damage to nearby structures. Patient consents to blood transfusion in event of life threatening bleeding. Discussed lifting restriction, anticipated hospital stay and post partum recovery course. All questions answered, written informed consent signed. Plan to proceed to OR within 30 minutes, and for azithromycin/ancef for antibiotics, unknown rupture status, rom + not performed due to vaginal bleeding. Candidate for all utero-tonics. Repeat coags drawn at 2230, CBC stable, INR/PTT wnl, fibrinogen pending. Discussed and seen with Dr. Diop.     Ev Cardenas MD  Obstetrics and Gynecology, PGY-3  2025 11:26 PM    Appreciate Dr. Cardenas's note above, patient also seen and examined by me, counseling preformed by me as well.  Repeat fibrinogen decreased from admit, although still normal. I agree with the note above.   Susi Diop MD

## 2025-06-29 NOTE — PROVIDER NOTIFICATION
06/28/25 2235   Provider Notification   Provider Name/Title QIAN Valenzuela CNM   Method of Notification In Department   Request Evaluate - Remote   Notification Reason Variability Change;Labor Status;Pain;Bleeding     Provider notification:   Provider: QIAN Valenzuela CNM was notified at 2200 regarding: a persistent Category II fetal heart rate tracing for 30 minutes.    Category II Algorithm     Fetal heart rate and uterine activity reviewed with provider.    EFM interpretation suggests: concern for persistent Category II tracing due to: minimal variability.  EFM suggests no concern for interruption of the oxygen pathway..     Interventions to improve fetal oxygenation for a Category II tracing include: Category II algorithm reviewed, evaluate labor progress, increase frequency of fetal and uterine assessment, IV fluid bolus , maternal positioning, and sterile vaginal exam    After discussion with provider:Plan reassessment in 60 minutes    Plan per provider / orders received for IV fluid bolus, plant to reassess in 60 min

## 2025-06-29 NOTE — PROVIDER NOTIFICATION
06/28/25 2208   Provider Notification   Provider Name/Title QIAN Valenzuela CNM   Method of Notification In Department   Request Evaluate in Person   Notification Reason Bleeding;Status Update;Pain;Variability Change     RN updated provider with pt reporting pain in-between contractions, uterine palpates soft between contractions. Increased bright red bleeding on chux. FHR minimal variability with periods of moderate and 15x15 accelerations. FHR reviewed with provider. Provider to come assess patient.

## 2025-06-29 NOTE — DISCHARGE SUMMARY
DELIVERY DISCHARGE SUMMARY    Sofia Rodriguez  : 1980  MRN: [id number]    Admit date: 2025  Discharge date: 2025     Admit Dx:   - 44 year old  at 38w2d  - Vaginal bleeding  - BRITNEY  - H/o TB  - Fetal VSD     Discharge Dx:  - Same as above, s/p procedure below  - Acute blood loss anemia  - Placental abruption     Procedures:  - Primary low transverse  section with single layer closure via Pfannenstiel incision    Admit HPI:  Sofia Rodriguez is a 44 year old, , who was admitted  to CNM team at 38w2d for IOL iso vaginal bleeding. Recommended admission for IOL.   Please see her admit H&P for full details of her PMH, PSH, Meds, Allergies and exam on admit.    Consults:  Anesthesia  Lactation    Hospital course:  Indications:    Sofia Rodriguez is a 44 year old, , who was admitted  to CNM team at 38w2d for IOL iso vaginal bleeding. Patient developed category II tracing remote from delivery with ongoing vaginal bleeding, and recommended proceeding with urgent . The risks, benefits, and alternatives of  delivery were explained and the patient agreed to proceed.     Operative Findings:  1. Single, viable male infant at 0002 hours on 2025. Apgars of 9 and 9 at one and five minutes.  Birth weight: 2570 g.  Fetal presentation: vertex. Amniotic fluid: clear, blood tinged.    2. Gases below   3. Placenta intact with 3 vessel cord. Small retroplacental clot adherent to posterior uterine wall, consistent with placental abruption    4. Normal appearing uterus, fallopian tubes, ovaries.   5.  No intraabdominal adhesions.  No abdominal wall adhesions    Latest Reference Range & Units 25 00:11   Ph Cord Arterial 7.16 - 7.39  7.15 (LL)   PCO2 Cord Arterial 35 - 71 mm Hg 55   PO2 Cord Arterial 10 - 33 mm Hg 24   Bicarbonate Cord Arterial 16 - 24 mmol/L 19   Base Excess/Deficit >-10.0 - -2.0 mmol/L -10.3 (LL)   Ph Cord Blood Venous  7.21 - 7.45  7.19 (L)   PCO2 Cord Venous 27 - 57 mm Hg 56   PO2 Cord Venous 21 - 37 mm Hg 14 (L)   Bicarbonate Cord Venous 16 - 24 mmol/L 21   Base Excess/Deficit Cord Venous >-10.0 - -2.0 mmol/L -8.0   (LL): Data is critically low  (L): Data is abnormally low    EBL from the delivery was 282 mL. Please see her  Section Operative Note for full details regarding her delivery.    Her postoperative course was complicated by increased incisional pain. On POD#2, she was meeting all of her postpartum goals and deemed stable for discharge. She was voiding without difficulty, tolerating a regular diet without nausea and vomiting, her pain was well controlled on oral pain medicines and her lochia was appropriate. Her hemoglobin prior to delivery was 12.4 and after delivery was 10.2. Her Rh status was pos and Rhogam was not indicated.      HGB  Recent Labs   Lab 25  0658 25  2248 25  1900 25  1529   HGB 10.2* 12.4 12.7 12.5       Discharge Medications:     Review of your medicines        START taking        Dose / Directions   acetaminophen 325 MG tablet  Commonly known as: TYLENOL  Used for: S/P  section      Dose: 650 mg  Take 2 tablets (650 mg) by mouth every 6 hours as needed for mild pain. Start after Delivery.  Quantity: 100 tablet  Refills: 0     cyclobenzaprine 5 MG tablet  Commonly known as: FLEXERIL  Used for: S/P  section      Dose: 5 mg  Take 1 tablet (5 mg) by mouth 3 times daily as needed for muscle spasms.  Quantity: 15 tablet  Refills: 0     ibuprofen 600 MG tablet  Commonly known as: ADVIL/MOTRIN  Used for: S/P  section      Dose: 600 mg  Take 1 tablet (600 mg) by mouth every 6 hours as needed for moderate pain. Start after delivery  Quantity: 60 tablet  Refills: 0     oxyCODONE 5 MG tablet  Commonly known as: ROXICODONE  Used for: S/P  section      Dose: 5 mg  Take 1 tablet (5 mg) by mouth every 4 hours as needed for moderate to severe  pain.  Quantity: 3 tablet  Refills: 0     senna-docusate 8.6-50 MG tablet  Commonly known as: SENOKOT-S/PERICOLACE  Used for: S/P  section      Dose: 1 tablet  Take 1 tablet by mouth daily. Start after delivery.  Quantity: 100 tablet  Refills: 0            CONTINUE these medicines which have NOT CHANGED        Dose / Directions   prenatal multivitamin w/iron 27-0.8 MG tablet      Dose: 1 tablet  Take 1 tablet by mouth daily.  Refills: 0     vitamin D3 125 MCG (5000 UT) tablet  Commonly known as: CHOLECALCIFEROL      Dose: 1 tablet  Take 1 tablet by mouth daily.  Refills: 0            STOP taking      aspirin 81 MG EC tablet        levothyroxine 50 MCG tablet  Commonly known as: SYNTHROID/LEVOTHROID        nitroFURantoin macrocrystal-monohydrate 100 MG capsule  Commonly known as: MACROBID                  Where to get your medicines        These medications were sent to [facility] [address]  [address] [address]      Phone: [phone]   acetaminophen 325 MG tablet  cyclobenzaprine 5 MG tablet  ibuprofen 600 MG tablet  oxyCODONE 5 MG tablet  senna-docusate 8.6-50 MG tablet         Discharge/Disposition:  Sofia Rodriguez was discharged to home in stable condition with the following instructions/medications:  1) Call for temperature > 100.4, bright red vaginal bleeding >1 pad an hour x 2 hours, foul smelling vaginal discharge, pain not controlled by usual oral pain meds, persistent nausea and vomiting not controlled on medications, drainage or redness from incision site  2) She will discuss contraception at 6w postpartum visit.   3) For feeding she decided to breast and bottle feed.  4) She was instructed to follow-up with her primary OB in 6 weeks for a routine postpartum visit and a 2 week incision/mood check.  5) Discharge activity:  No heavy lifting >15 lbs or strenuous activity for 6 weeks, pelvic rest for 6 weeks, no driving or  operating machinery while on narcotics.      Killian Braden MD, MSc  [facility], PGY-2  07/01/2025 3:25 PM    OBGYN Attending Attestation    I, Fernando Galvez, personally examined and evaluated Sofia Rodriguez on 7/1/2025.  I agree with the presentation, hospital details, and plan of care this discharge summary with edits by me.     Fernando Galvez MD    [facility]  Obstetrics, Gynecology, and Women's Health  3:25 PM 07/01/2025
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
## 2025-06-29 NOTE — ANESTHESIA PREPROCEDURE EVALUATION
Anesthesia Pre-Procedure Evaluation    Patient: Sofia Rodriguez   MRN: [id number] : 1980          Procedure :          Past Medical History:   Diagnosis Date    Anemia, iron deficiency     Anxiety     Cystocele     Fibroid uterus     Gastric ulcer due to Helicobacter pylori, acute     Hypothyroidism     PTSD (post-traumatic stress disorder)     Pyelonephritis       Past Surgical History:   Procedure Laterality Date    IR LIVER BIOPSY PERCUTANEOUS  2022    PERCUTANEOUS BIOPSY LIVER N/A 2022    Procedure: NEEDLE BIOPSY, LIVER, PERCUTANEOUS;  Surgeon: Aman Streeter MD;  Location: [facility] OR      No Known Allergies   Social History     Tobacco Use    Smoking status: Never    Smokeless tobacco: Never   Substance Use Topics    Alcohol use: Never      Wt Readings from Last 1 Encounters:   25 70.4 kg (155 lb 1.6 oz)        Anesthesia Evaluation   Pt has had prior anesthetic. Type: MAC.    No history of anesthetic complications       ROS/MED HX  ENT/Pulmonary:  - neg pulmonary ROS  (-) asthma   Neurologic:  - neg neurologic ROS   (+)    no peripheral neuropathy                            Cardiovascular:  - neg cardiovascular ROS  (-) PIH   METS/Exercise Tolerance:     Hematologic:  - neg hematologic  ROS  (-) anemia   Musculoskeletal:   (+)         lumbar spine      GI/Hepatic:    (-) GERD   Renal/Genitourinary:       Endo:     (+)          thyroid problem, hypothyroidism,           Psychiatric/Substance Use:  - neg psychiatric ROS     Infectious Disease:       Malignancy:       Other:    @ 38w1d            Physical Exam  Airway  Mallampati: III  TM distance: > 3 FB  Neck ROM: full    Cardiovascular - normal exam   Dental - normal exam    Pulmonary - normal exam      Neurological   Other Findings       OUTSIDE LABS:  CBC:   Lab Results   Component Value Date    WBC 12.1 (H) 2025    WBC 10.0 2025    HGB 12.7 2025    HGB 12.5 2025    HCT 36.7 2025    HCT  36.0 06/28/2025     06/28/2025     06/28/2025     BMP:   Lab Results   Component Value Date     02/15/2025     02/14/2025    POTASSIUM 3.5 02/16/2025    POTASSIUM 3.8 02/15/2025    CHLORIDE 104 02/15/2025    CHLORIDE 107 02/14/2025    CO2 17 (L) 02/15/2025    CO2 16 (L) 02/14/2025    BUN 10.4 02/15/2025    BUN 8.6 02/14/2025    CR 0.66 02/15/2025    CR 0.69 02/14/2025    GLC 79 02/15/2025    GLC 94 02/14/2025     COAGS:   Lab Results   Component Value Date    PTT 29 06/28/2025    INR 0.87 06/28/2025    FIBR 551 (H) 06/28/2025     POC:   Lab Results   Component Value Date    HCG Negative 09/07/2022     HEPATIC:   Lab Results   Component Value Date    ALBUMIN 3.2 (L) 02/14/2025    PROTTOTAL 7.1 02/14/2025    ALT 22 02/14/2025    AST 19 02/14/2025    ALKPHOS 163 (H) 02/14/2025    BILITOTAL 0.6 02/14/2025     OTHER:   Lab Results   Component Value Date    STEVE 9.1 02/15/2025    TSH 2.27 02/14/2025       Anesthesia Plan    ASA Status:  2       Anesthesia Type: Spinal.        Consents    Anesthesia Plan(s) and associated risks, benefits, and realistic alternatives discussed. Questions answered and patient/representative(s) expressed understanding.     - Discussed:     - Discussed with:  Patient               Postoperative Care         Comments:    Other Comments: Urgent C section called at 2310PM. Anesthesia team ready at 2329PM.   In person . Discussed risks of spinal anesthesia including bleeding, infection, hypotension, nerve injury, headache, conversion to general anesthesia. Discussed alternatives to spinal anesthesia. Discussed risks of backup general anesthesia, including aspiration (with possible sequelae of pneumonia), difficult intubation, sore throat/hoarse voice, abrasions/damage to lips/tongue/teeth, nausea, rare complications (including medication reactions, cardiac, pulmonary). Ensured understanding, invited questions and all questions were  answered.  Postoperative transversus abdominis plane block with liposomal bupivacaine requested by Dr. Diop. Discussed risks of nerve block, including nerve injury, bleeding, infection. Discussed anticipated incomplete analgesia. Discussed alternative of not performing a nerve block. Ensured understanding, invited questions and all questions were answered. Patient was initially unsure, but after answering her questions, she wishes to proceed.               Melanie Hensley, DO    I have reviewed the pertinent notes and labs in the chart from the past 30 days and (re)examined the patient.  Any updates or changes from those notes are reflected in this note.    Clinically Significant Risk Factors Present on Admission                 # Drug Induced Platelet Defect: home medication list includes an antiplatelet medication                 # Financial/Environmental Concerns:
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
## 2025-06-29 NOTE — PLAN OF CARE
"Goal Outcome Evaluation:      Plan of Care Reviewed With: patient    Overall Patient Progress: improvingOverall Patient Progress: improving    VSS and postpartum assessments WDL.  Pt not OOB yet but able to bend knees in bed.  Bonding well with infant.  Breastfeeding on cue independently and supplementing with formula.  Pain managed with Tylenol and Toradol.  Family present and supportive.  Continue with postpartum cares and education per plan of care.         Problem: Adult Inpatient Plan of Care  Goal: Plan of Care Review  Description: The Plan of Care Review/Shift note should be completed every shift.  The Outcome Evaluation is a brief statement about your assessment that the patient is improving, declining, or no change.  This information will be displayed automatically on your shift  note.  Outcome: Progressing  Flowsheets (Taken 6/29/2025 0534)  Plan of Care Reviewed With: patient  Overall Patient Progress: improving  Goal: Patient-Specific Goal (Individualized)  Description: You can add care plan individualizations to a care plan. Examples of Individualization might be:  \"Parent requests to be called daily at 9am for status\", \"I have a hard time hearing out of my right ear\", or \"Do not touch me to wake me up as it startles  me\".  Outcome: Progressing  Goal: Absence of Hospital-Acquired Illness or Injury  Outcome: Progressing  Intervention: Prevent Skin Injury  Recent Flowsheet Documentation  Taken 6/29/2025 0330 by Casi Fleming, RN  Body Position: position changed independently  Intervention: Prevent and Manage VTE (Venous Thromboembolism) Risk  Recent Flowsheet Documentation  Taken 6/29/2025 0330 by Casi Fleming, RN  VTE Prevention/Management: SCDs on (sequential compression devices)  Intervention: Prevent Infection  Recent Flowsheet Documentation  Taken 6/29/2025 0330 by Casi Fleming, RN  Infection Prevention:   equipment surfaces disinfected   personal protective equipment utilized   hand " hygiene promoted   rest/sleep promoted  Goal: Optimal Comfort and Wellbeing  Outcome: Progressing  Intervention: Provide Person-Centered Care  Recent Flowsheet Documentation  Taken 2025 by Casi Fleming, RN  Trust Relationship/Rapport:   care explained   choices provided   questions answered   questions encouraged   reassurance provided   thoughts/feelings acknowledged  Goal: Readiness for Transition of Care  Outcome: Progressing     Problem: IP NDI OP/OBS DISCHARGE CHECKLIST  Goal: Returns to baseline functional status  Outcome: Progressing  Goal: Vital signs normal or at patient baseline  Outcome: Progressing  Goal: Diagnostic tests/labs and consults completed and resulted  Outcome: Progressing  Goal: Adequate pain control on oral analgesics  Outcome: Progressing  Goal: Tolerating oral antibiotics or has plans for home infusion setup  Outcome: Progressing  Goal: TIA: Free from ACUTE neuro deficits  Outcome: Progressing  Goal: SYNCOPY: No further episodes of syncope and any new arrhythmia addressed with controlled heart rates  Outcome: Progressing  Goal: Infection is improving  Outcome: Progressing  Goal: Dyspnea improved and O2 sats greater than 88% on room air or prior home oxygen levels  Outcome: Progressing     Problem: Postpartum ( Delivery)  Goal: Successful Parent Role Transition  Outcome: Progressing  Intervention: Support Parent Role Transition  Recent Flowsheet Documentation  Taken 2025 by Casi Fleming, RN  Supportive Measures:   active listening utilized   decision-making supported   goal-setting facilitated   relaxation techniques promoted   self-care encouraged   verbalization of feelings encouraged  Parent-Child Attachment Promotion:   caring behavior modeled   cue recognition promoted   face-to-face positioning promoted   interaction encouraged   parent/caregiver presence encouraged   participation in care promoted   positive reinforcement provided   rooming-in  promoted   skin-to-skin contact encouraged   strengths emphasized  Goal: Hemostasis  Outcome: Progressing  Goal: Effective Bowel Elimination  Outcome: Progressing  Goal: Fluid and Electrolyte Balance  Outcome: Progressing  Goal: Absence of Infection Signs and Symptoms  Outcome: Progressing  Intervention: Prevent or Manage Infection  Recent Flowsheet Documentation  Taken 6/29/2025 0330 by Casi Fleming, RN  Infection Management: aseptic technique maintained  Goal: Anesthesia/Sedation Recovery  Outcome: Progressing  Goal: Optimal Pain Control and Function  Outcome: Progressing  Goal: Nausea and Vomiting Relief  Outcome: Progressing  Goal: Effective Urinary Elimination  Outcome: Progressing  Goal: Effective Oxygenation and Ventilation  Outcome: Progressing  Intervention: Optimize Oxygenation and Ventilation  Recent Flowsheet Documentation  Taken 6/29/2025 0330 by Casi Fleming, RN  Head of Bed (HOB) Positioning: HOB at 30-45 degrees

## 2025-06-29 NOTE — ANESTHESIA PROCEDURE NOTES
Intrathecal injection Procedure Note    Pre-Procedure   Staff -        Anesthesiologist:  Bianca Méndez MD       Resident/Fellow: Melanie Hensley DO       Performed By: resident and with residents       Procedure performed by resident/fellow/CRNA in presence of a teaching physician.         Location: OB       Procedure Start/Stop Times: 6/28/2025 11:40 PM and 6/28/2025 11:43 PM       Pre-Anesthestic Checklist: patient identified, IV checked, risks and benefits discussed, informed consent, monitors and equipment checked, pre-op evaluation, at physician/surgeon's request and post-op pain management  Timeout:       Correct Patient: Yes        Correct Procedure: Yes        Correct Site: Yes        Correct Position: Yes   Procedure Documentation  Procedure: intrathecal injection         Diagnosis: labor analgesia       Patient Position: sitting       Skin prep: Chloraprep       Insertion Site: L3-4. (midline approach).       Needle Gauge: 25.        Needle Length (Inches): 3.5        Spinal Needle Type: Pencan       Introducer used       Introducer: 20 G       # of attempts: 1 and  # of redirects:  0    Assessment/Narrative         Paresthesias: No.       Sensory Level: T4       CSF fluid: clear.       Opening pressure was cmH2O while  Sitting.      Medication(s) Administered   0.75% Hyperbaric Bupivacaine (Intrathecal) - Intrathecal   1.4 mL - 6/28/2025 11:40:00 PM  Fentanyl PF (Intrathecal) - Intrathecal   15 mcg - 6/28/2025 11:40:00 PM  Morphine PF 1 mg/mL (Intrathecal) - Intrathecal   0.15 mg - 6/28/2025 11:40:00 PM  Medication Administration Time: 6/28/2025 11:40 PM     Comments:  In person  throughout consent and procedure.   Discussed risks of spinal anesthesia including bleeding, infection, hypotension, nerve injury, headache, conversion to general anesthesia. Ensured understanding, invited questions and all questions were answered.    Patient tolerated well. No paresthesia, no heme.  "Clear CSF. Appropriate block afterwards and comfortable with start of surgery.           FOR [facility] ([facility]) ONLY:   Pain Team Contact information: please page the Pain Team Via [employer]. Search \"Pain\". During daytime hours, please page the attending first. At night please page the resident first.      "

## 2025-06-29 NOTE — ANESTHESIA PROCEDURE NOTES
TAP Procedure Note    Pre-Procedure   Staff -        Anesthesiologist:  Bianca Méndez MD       Resident/Fellow: Melanie Hensley DO       Performed By: resident and with residents       Procedure performed by resident/fellow/CRNA in presence of a teaching physician.         Location: OB       Procedure Start/Stop Times: 6/29/2025 12:32 AM and 6/29/2025 12:42 AM       Pre-Anesthestic Checklist: patient identified, IV checked, site marked, risks and benefits discussed, informed consent, monitors and equipment checked, pre-op evaluation, at physician/surgeon's request and post-op pain management  Timeout:       Correct Patient: Yes        Correct Procedure: Yes        Correct Site: Yes        Correct Position: Yes        Correct Laterality: Yes        Site Marked: Yes  Procedure Documentation  Procedure: TAP         Diagnosis: POST OPERATIVE PAIN       Laterality: bilateral       Patient Position: supine       Patient Prep/Sterile Barriers: sterile gloves, mask       Skin prep: Chloraprep       Needle Type: short bevel       Needle Gauge: 21.        Needle Length (millimeters): 110        Ultrasound guided       1. Ultrasound was used to identify targeted nerve, plexus, vascular marker, or fascial plane and place a needle adjacent to it in real-time.       2. Ultrasound was used to visualize the spread of anesthetic in close proximity to the above referenced structure.       3. A permanent image is entered into the patient's record.    Assessment/Narrative         The placement was negative for: blood aspirated, painful injection and site bleeding       Paresthesias: No.       Bolus given via needle..        Secured via.        Insertion/Infusion Method: Single Shot       Complications: none       Injection made incrementally with aspirations every 5 mL.    Medication(s) Administered   Bupivacaine 0.25% PF (Infiltration) - Infiltration   20 mL - 6/29/2025 12:36:00 AM  Bupivacaine liposome (Exparel) 1.3% LA inj  "susp (Infiltration) - Infiltration   20 mL - 6/29/2025 12:36:00 AM  Medication Administration Time: 6/29/2025 12:32 AM     Comments:  In person  present throughout discussion, consent and procedure. Discussed risks of nerve block, including nerve injury, bleeding, infection. Discussed anticipated incomplete analgesia. Discussed alternative of not performing a nerve block. Ensured understanding, invited questions and all questions were answered. Patient wishes to proceed.    Informed consent was obtained.   Patient tolerated well. Incremental aspiration every 1-5 mL. No paresthesia, no heme. Needle tip visualized throughout with appropriate spread of local anesthetic in fascial planes bilaterally.   Block was placed at the surgeon's request for post operative pain control.          FOR [facility] ([facility]) ONLY:   Pain Team Contact information: please page the Pain Team Via [employer]. Search \"Pain\". During daytime hours, please page the attending first. At night please page the resident first.      "

## 2025-06-29 NOTE — PROGRESS NOTES
VSS and patient afebrile. RN assumed care at 2310 when CS was called. Pre-op cares started. Paper CS consent signed. Patient moved to the OR and delivered a viable baby boy at 0002 via CS with NICU present. Apgars 9/9. . TAP block received in OR. Patient transferred to PACU with anesthesia and post-op cares started. See MAR for Toradol administration. Report given to Casi, anticipate transfer up to [facility].

## 2025-06-29 NOTE — PLAN OF CARE
Plan made to induce/augment labor due to bleeding. Pt transferred to 7 and oriented to room/call light.  present. Pt feeling stronger contractions and breathing through them. EFM with moderate variability and accels, no decels.

## 2025-06-30 ENCOUNTER — VIRTUAL VISIT (OUTPATIENT)
Dept: INTERPRETER SERVICES | Facility: CLINIC | Age: 45
End: 2025-06-30
Payer: COMMERCIAL

## 2025-06-30 LAB
RUBV IGG SERPL QL IA: 1.49 INDEX
RUBV IGG SERPL QL IA: POSITIVE

## 2025-06-30 PROCEDURE — 250N000013 HC RX MED GY IP 250 OP 250 PS 637

## 2025-06-30 PROCEDURE — T1013 SIGN LANG/ORAL INTERPRETER: HCPCS | Mod: U4,TEL,95

## 2025-06-30 PROCEDURE — 120N000002 HC R&B MED SURG/OB UMMC

## 2025-06-30 RX ORDER — ONDANSETRON 4 MG/1
4 TABLET, ORALLY DISINTEGRATING ORAL EVERY 30 MIN PRN
Status: DISCONTINUED | OUTPATIENT
Start: 2025-06-30 | End: 2025-06-30

## 2025-06-30 RX ORDER — HYDROMORPHONE HCL IN WATER/PF 6 MG/30 ML
0.4 PATIENT CONTROLLED ANALGESIA SYRINGE INTRAVENOUS EVERY 5 MIN PRN
Status: DISCONTINUED | OUTPATIENT
Start: 2025-06-30 | End: 2025-06-30

## 2025-06-30 RX ORDER — ONDANSETRON 2 MG/ML
4 INJECTION INTRAMUSCULAR; INTRAVENOUS EVERY 30 MIN PRN
Status: DISCONTINUED | OUTPATIENT
Start: 2025-06-30 | End: 2025-06-30

## 2025-06-30 RX ORDER — OXYTOCIN 10 [USP'U]/ML
10 INJECTION, SOLUTION INTRAMUSCULAR; INTRAVENOUS
Status: DISCONTINUED | OUTPATIENT
Start: 2025-06-30 | End: 2025-07-01 | Stop reason: HOSPADM

## 2025-06-30 RX ORDER — IBUPROFEN 600 MG/1
600 TABLET, FILM COATED ORAL EVERY 6 HOURS PRN
Qty: 60 TABLET | Refills: 0 | Status: SHIPPED | OUTPATIENT
Start: 2025-06-30

## 2025-06-30 RX ORDER — LIDOCAINE 40 MG/G
CREAM TOPICAL
Status: DISCONTINUED | OUTPATIENT
Start: 2025-06-30 | End: 2025-06-30

## 2025-06-30 RX ORDER — DEXAMETHASONE SODIUM PHOSPHATE 4 MG/ML
4 INJECTION, SOLUTION INTRA-ARTICULAR; INTRALESIONAL; INTRAMUSCULAR; INTRAVENOUS; SOFT TISSUE
Status: DISCONTINUED | OUTPATIENT
Start: 2025-06-30 | End: 2025-06-30

## 2025-06-30 RX ORDER — NALOXONE HYDROCHLORIDE 0.4 MG/ML
0.1 INJECTION, SOLUTION INTRAMUSCULAR; INTRAVENOUS; SUBCUTANEOUS
Status: DISCONTINUED | OUTPATIENT
Start: 2025-06-30 | End: 2025-06-30

## 2025-06-30 RX ORDER — FENTANYL CITRATE 50 UG/ML
25 INJECTION, SOLUTION INTRAMUSCULAR; INTRAVENOUS EVERY 5 MIN PRN
Status: DISCONTINUED | OUTPATIENT
Start: 2025-06-30 | End: 2025-06-30

## 2025-06-30 RX ORDER — OXYCODONE HYDROCHLORIDE 5 MG/1
10 TABLET ORAL
Status: DISCONTINUED | OUTPATIENT
Start: 2025-06-30 | End: 2025-06-30

## 2025-06-30 RX ORDER — LIDOCAINE 4 G/G
2 PATCH TOPICAL
Status: DISCONTINUED | OUTPATIENT
Start: 2025-07-01 | End: 2025-07-01 | Stop reason: HOSPADM

## 2025-06-30 RX ORDER — AMOXICILLIN 250 MG
1 CAPSULE ORAL DAILY
Qty: 100 TABLET | Refills: 0 | Status: SHIPPED | OUTPATIENT
Start: 2025-06-30

## 2025-06-30 RX ORDER — SODIUM CHLORIDE, SODIUM LACTATE, POTASSIUM CHLORIDE, CALCIUM CHLORIDE 600; 310; 30; 20 MG/100ML; MG/100ML; MG/100ML; MG/100ML
INJECTION, SOLUTION INTRAVENOUS CONTINUOUS
Status: DISCONTINUED | OUTPATIENT
Start: 2025-06-30 | End: 2025-07-01 | Stop reason: HOSPADM

## 2025-06-30 RX ORDER — OXYCODONE HYDROCHLORIDE 5 MG/1
5 TABLET ORAL
Status: DISCONTINUED | OUTPATIENT
Start: 2025-06-30 | End: 2025-06-30

## 2025-06-30 RX ORDER — OXYTOCIN/0.9 % SODIUM CHLORIDE 30/500 ML
100-340 PLASTIC BAG, INJECTION (ML) INTRAVENOUS CONTINUOUS PRN
Status: DISCONTINUED | OUTPATIENT
Start: 2025-06-30 | End: 2025-07-01 | Stop reason: HOSPADM

## 2025-06-30 RX ORDER — ACETAMINOPHEN 325 MG/1
650 TABLET ORAL EVERY 6 HOURS PRN
Qty: 100 TABLET | Refills: 0 | Status: SHIPPED | OUTPATIENT
Start: 2025-06-30

## 2025-06-30 RX ORDER — HYDROMORPHONE HCL IN WATER/PF 6 MG/30 ML
0.2 PATIENT CONTROLLED ANALGESIA SYRINGE INTRAVENOUS EVERY 5 MIN PRN
Status: DISCONTINUED | OUTPATIENT
Start: 2025-06-30 | End: 2025-06-30

## 2025-06-30 RX ORDER — FENTANYL CITRATE 50 UG/ML
50 INJECTION, SOLUTION INTRAMUSCULAR; INTRAVENOUS EVERY 5 MIN PRN
Status: DISCONTINUED | OUTPATIENT
Start: 2025-06-30 | End: 2025-06-30

## 2025-06-30 RX ADMIN — CYCLOBENZAPRINE HYDROCHLORIDE 10 MG: 5 TABLET, FILM COATED ORAL at 18:10

## 2025-06-30 RX ADMIN — DOCUSATE SODIUM AND SENNOSIDES 2 TABLET: 8.6; 5 TABLET, FILM COATED ORAL at 09:09

## 2025-06-30 RX ADMIN — ACETAMINOPHEN 975 MG: 325 TABLET ORAL at 06:46

## 2025-06-30 RX ADMIN — IBUPROFEN 800 MG: 800 TABLET, FILM COATED ORAL at 12:45

## 2025-06-30 RX ADMIN — IBUPROFEN 800 MG: 800 TABLET, FILM COATED ORAL at 18:38

## 2025-06-30 RX ADMIN — ACETAMINOPHEN 975 MG: 325 TABLET ORAL at 12:45

## 2025-06-30 RX ADMIN — ACETAMINOPHEN 975 MG: 325 TABLET ORAL at 00:43

## 2025-06-30 RX ADMIN — IBUPROFEN 800 MG: 800 TABLET, FILM COATED ORAL at 00:43

## 2025-06-30 RX ADMIN — CYCLOBENZAPRINE HYDROCHLORIDE 10 MG: 5 TABLET, FILM COATED ORAL at 09:09

## 2025-06-30 RX ADMIN — POLYETHYLENE GLYCOL 3350 17 G: 17 POWDER, FOR SOLUTION ORAL at 09:09

## 2025-06-30 RX ADMIN — OXYCODONE HYDROCHLORIDE 5 MG: 5 TABLET ORAL at 12:48

## 2025-06-30 RX ADMIN — ACETAMINOPHEN 975 MG: 325 TABLET ORAL at 18:38

## 2025-06-30 RX ADMIN — IBUPROFEN 800 MG: 800 TABLET, FILM COATED ORAL at 06:46

## 2025-06-30 ASSESSMENT — ACTIVITIES OF DAILY LIVING (ADL)
ADLS_ACUITY_SCORE: 22

## 2025-06-30 NOTE — PLAN OF CARE
Goal Outcome Evaluation:  Shift [id number]  Afebrile. VSS, except 6/10. Fundus U1, scant, rubra lochia. LS clear on RA. Good PO intake, good appetite. Denies PreE Symptoms at this time. Incision site is covered. Patient is using belly band and tolerating well. Voiding independently, passing gas. Taking IBU, tylenol and flexeril as needed. Bonding well with infant in room. Helping with infant cares. Patient is formula feeding every 2-3 hours. Hourly monitoring completed.     Problem: Adult Inpatient Plan of Care  Goal: Plan of Care Review  Description: The Plan of Care Review/Shift note should be completed every shift.  The Outcome Evaluation is a brief statement about your assessment that the patient is improving, declining, or no change.  This information will be displayed automatically on your shift  note.  Outcome: Progressing  Goal: Absence of Hospital-Acquired Illness or Injury  Intervention: Prevent Skin Injury  Recent Flowsheet Documentation  Taken 6/30/2025 0912 by Samaria Costello RN  Body Position: position changed independently  Intervention: Prevent and Manage VTE (Venous Thromboembolism) Risk  Recent Flowsheet Documentation  Taken 6/30/2025 0912 by Samaria Costello RN  VTE Prevention/Management: (fluids and ambulation promoted) other (see comments)  Intervention: Prevent Infection  Recent Flowsheet Documentation  Taken 6/30/2025 0912 by Samaria Costello RN  Infection Prevention:   cohorting utilized   hand hygiene promoted   personal protective equipment utilized   rest/sleep promoted  Goal: Optimal Comfort and Wellbeing  Outcome: Progressing  Intervention: Provide Person-Centered Care  Recent Flowsheet Documentation  Taken 6/30/2025 0912 by Samaria Costello RN  Trust Relationship/Rapport:   care explained   choices provided   emotional support provided   empathic listening provided   questions answered   questions encouraged   thoughts/feelings acknowledged   reassurance provided  Goal: Readiness for  Transition of Care  Outcome: Progressing     Problem:  Delivery  Goal: Stable Fetal Wellbeing  Intervention: Promote and Monitor Fetal Wellbeing  Recent Flowsheet Documentation  Taken 2025 by Samaria Costello RN  Body Position: position changed independently  Goal: Absence of Infection Signs and Symptoms  Intervention: Prevent or Manage Infection  Recent Flowsheet Documentation  Taken 2025 by Samaria Costello RN  Infection Prevention:   cohorting utilized   hand hygiene promoted   personal protective equipment utilized   rest/sleep promoted  Infection Management: aseptic technique maintained     Problem: Postpartum ( Delivery)  Goal: Successful Parent Role Transition  Outcome: Progressing  Intervention: Support Parent Role Transition  Recent Flowsheet Documentation  Taken 2025 by Samaria Costello RN  Supportive Measures:   active listening utilized   positive reinforcement provided   self-care encouraged  Parent-Child Attachment Promotion:   caring behavior modeled   face-to-face positioning promoted   cue recognition promoted   interaction encouraged   parent/caregiver presence encouraged   positive reinforcement provided   participation in care promoted   rooming-in promoted   skin-to-skin contact encouraged  Goal: Absence of Infection Signs and Symptoms  Intervention: Prevent or Manage Infection  Recent Flowsheet Documentation  Taken 2025 by Samaria Costello RN  Infection Management: aseptic technique maintained  Goal: Optimal Pain Control and Function  Outcome: Progressing  Goal: Effective Oxygenation and Ventilation  Intervention: Optimize Oxygenation and Ventilation  Recent Flowsheet Documentation  Taken 2025 by Samaria Costello RN  Head of Bed (HOB) Positioning: HOB at 60-90 degrees
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
## 2025-06-30 NOTE — PROGRESS NOTES
Anesthesia Post-Partum Follow-Up Note After  Delivery with Spinal    Patient: Sofia Rodriguez    Patient location: Post-partum floor    Anesthesia type: Spinal Block    Subjective  Sofia Rodriguez does not complain of pruritis at this time. She denies weakness, denies paresthesia, denies difficulties voiding, denies nausea or vomiting, and denies headache. She is able to ambulate and tolerates regular diet.     Objective  Respiratory Function (RR / SpO2 / Airway Patency): Satisfactory  Cardiac Function (HR / Rhythm / BP): Satisfactory  Strength and sensation lower extremities: Normal    Most recent vitals  /74   Pulse 71   Temp 36.5  C (97.7  F) (Oral)   Resp 18   Ht 1.524 m (5')   Wt 70.4 kg (155 lb 1.6 oz)   LMP 10/03/2024   SpO2 98%   Breastfeeding Unknown   BMI 30.29 kg/m      Assessment and plan  Sofia Rodriguez is a 44 year old female  post partum day #1 s/p  delivery with Spinal and TAP block for post-operative pain control.     At this time, there is no evidence of adverse side effects associated with anesthetic procedures performed. We encourage the continued use of multimodal analgesic therapy for adequate pain management over the next several days, and if any questions arise regarding anesthetic care, please reach out to the obstetric anesthesiology team.     Thank you for including us in the care of this patient.    Melanie Hensley DO   Anesthesiology Resident, PGY-4/CA-3

## 2025-06-30 NOTE — ANESTHESIA POSTPROCEDURE EVALUATION
Patient: Sofia Rodriguez    Procedure: Procedure(s):   section       Anesthesia Type:  Spinal    Note:  Disposition: Inpatient   Postop Pain Control: Uneventful            Sign Out: Well controlled pain   PONV: No   Neuro/Psych: Uneventful            Sign Out: Acceptable/Baseline neuro status   Airway/Respiratory: Uneventful            Sign Out: Acceptable/Baseline resp. status   CV/Hemodynamics: Uneventful            Sign Out: Acceptable CV status; No obvious hypovolemia; No obvious fluid overload   Other NRE:    DID A NON-ROUTINE EVENT OCCUR?     Event details/Postop Comments:  Doing well. Alert, oriented. Anticipate spinal block wearing off appropriately.            Last vitals:  Vitals Value Taken Time   /95 25 02:30   Temp     Pulse 65 25 01:53   Resp 19 25 01:53   SpO2 98 % 25 02:34   Vitals shown include unfiled device data.    Electronically Signed By: Bianca Méndez MD  2025  7:47 PM

## 2025-06-30 NOTE — PLAN OF CARE
"Goal Outcome Evaluation:      Plan of Care Reviewed With: patient    Overall Patient Progress: improvingOverall Patient Progress: improving    VSS and postpartum assessments WDL.  Up ad ralph with steady gait and independent with cares.  Bonding well with infant.  Breastfeeding with nipple shield on cue independently and supplementing with formula.  Pain managed with Tylenol, Ibuprofen, Oxycodone, and Simethicone.  Daughter present and supportive.  Continue with postpartum cares and education per plan of care.       Problem: Adult Inpatient Plan of Care  Goal: Plan of Care Review  Description: The Plan of Care Review/Shift note should be completed every shift.  The Outcome Evaluation is a brief statement about your assessment that the patient is improving, declining, or no change.  This information will be displayed automatically on your shift  note.  Outcome: Progressing  Flowsheets (Taken 6/30/2025 0532)  Plan of Care Reviewed With: patient  Overall Patient Progress: improving  Goal: Patient-Specific Goal (Individualized)  Description: You can add care plan individualizations to a care plan. Examples of Individualization might be:  \"Parent requests to be called daily at 9am for status\", \"I have a hard time hearing out of my right ear\", or \"Do not touch me to wake me up as it startles  me\".  Outcome: Progressing  Goal: Absence of Hospital-Acquired Illness or Injury  Outcome: Progressing  Intervention: Prevent Skin Injury  Recent Flowsheet Documentation  Taken 6/29/2025 1920 by Casi Fleming, RN  Body Position: position changed independently  Intervention: Prevent and Manage VTE (Venous Thromboembolism) Risk  Recent Flowsheet Documentation  Taken 6/29/2025 1920 by Casi Fleming, RN  VTE Prevention/Management: (pt ambulating) SCDs off (sequential compression devices)  Intervention: Prevent Infection  Recent Flowsheet Documentation  Taken 6/29/2025 1920 by Casi Fleming, RN  Infection Prevention:   " equipment surfaces disinfected   personal protective equipment utilized   hand hygiene promoted   rest/sleep promoted  Goal: Optimal Comfort and Wellbeing  Outcome: Progressing  Intervention: Monitor Pain and Promote Comfort  Recent Flowsheet Documentation  Taken 2025 by Casi Fleming RN  Pain Management Interventions: cold applied  Intervention: Provide Person-Centered Care  Recent Flowsheet Documentation  Taken 2025 by Casi Fleming RN  Trust Relationship/Rapport:   care explained   choices provided   questions answered   questions encouraged   reassurance provided   thoughts/feelings acknowledged  Goal: Readiness for Transition of Care  Outcome: Progressing     Problem: Postpartum ( Delivery)  Goal: Successful Parent Role Transition  Outcome: Progressing  Intervention: Support Parent Role Transition  Recent Flowsheet Documentation  Taken 2025 by Casi Fleming RN  Supportive Measures:   active listening utilized   decision-making supported   goal-setting facilitated   relaxation techniques promoted   self-care encouraged   verbalization of feelings encouraged  Parent-Child Attachment Promotion:   caring behavior modeled   cue recognition promoted   face-to-face positioning promoted   interaction encouraged   parent/caregiver presence encouraged   participation in care promoted   positive reinforcement provided   rooming-in promoted   skin-to-skin contact encouraged   strengths emphasized  Goal: Hemostasis  Outcome: Progressing  Goal: Effective Bowel Elimination  Outcome: Progressing  Goal: Fluid and Electrolyte Balance  Outcome: Progressing  Goal: Absence of Infection Signs and Symptoms  Outcome: Progressing  Intervention: Prevent or Manage Infection  Recent Flowsheet Documentation  Taken 2025 by Casi Fleming RN  Infection Management: aseptic technique maintained  Goal: Optimal Pain Control and Function  Outcome: Progressing  Intervention:  Prevent or Manage Pain  Recent Flowsheet Documentation  Taken 6/29/2025 1920 by Casi Fleming, RN  Pain Management Interventions: cold applied
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
## 2025-06-30 NOTE — PLAN OF CARE
Goal Outcome Evaluation:  Data: Vital signs within normal limits. Postpartum checks within normal limits - see flow record. Patient eating and drinking normally. Patient able to empty bladder independently and is up ambulating. No apparent signs of infection. Incision healing well. Daughter at bedside helping with pt and baby cares   Action: Patient medicated during the shift for pain. See MAR. Patient reassessed within 1 hour after each medication and pain was improved - patient stated she was comfortable. Up seating on chair.   Response: Positive attachment behaviors observed with infant. Mainly formula feeding.   Plan: Anticipate discharge on tomorrow 7/1.

## 2025-07-01 ENCOUNTER — VIRTUAL VISIT (OUTPATIENT)
Dept: INTERPRETER SERVICES | Facility: CLINIC | Age: 45
End: 2025-07-01
Payer: COMMERCIAL

## 2025-07-01 VITALS
SYSTOLIC BLOOD PRESSURE: 104 MMHG | HEIGHT: 60 IN | DIASTOLIC BLOOD PRESSURE: 66 MMHG | OXYGEN SATURATION: 98 % | HEART RATE: 64 BPM | TEMPERATURE: 97.7 F | WEIGHT: 148.4 LBS | RESPIRATION RATE: 16 BRPM | BODY MASS INDEX: 29.13 KG/M2

## 2025-07-01 PROCEDURE — G0010 ADMIN HEPATITIS B VACCINE: HCPCS

## 2025-07-01 PROCEDURE — 250N000021 HC RX MED A9270 GY (STAT IND- M) 250

## 2025-07-01 PROCEDURE — 250N000013 HC RX MED GY IP 250 OP 250 PS 637

## 2025-07-01 PROCEDURE — 99238 HOSP IP/OBS DSCHRG MGMT 30/<: CPT | Mod: GC | Performed by: STUDENT IN AN ORGANIZED HEALTH CARE EDUCATION/TRAINING PROGRAM

## 2025-07-01 PROCEDURE — 90746 HEPB VACCINE 3 DOSE ADULT IM: CPT

## 2025-07-01 PROCEDURE — T1013 SIGN LANG/ORAL INTERPRETER: HCPCS | Mod: U4,TEL,95

## 2025-07-01 PROCEDURE — 250N000011 HC RX IP 250 OP 636

## 2025-07-01 PROCEDURE — 90472 IMMUNIZATION ADMIN EACH ADD: CPT

## 2025-07-01 PROCEDURE — 90716 VAR VACCINE LIVE SUBQ: CPT

## 2025-07-01 PROCEDURE — 250N000013 HC RX MED GY IP 250 OP 250 PS 637: Performed by: OBSTETRICS & GYNECOLOGY

## 2025-07-01 RX ORDER — OXYCODONE HYDROCHLORIDE 5 MG/1
5 TABLET ORAL EVERY 4 HOURS PRN
Qty: 3 TABLET | Refills: 0 | Status: SHIPPED | OUTPATIENT
Start: 2025-07-01

## 2025-07-01 RX ORDER — CYCLOBENZAPRINE HCL 5 MG
5 TABLET ORAL 3 TIMES DAILY PRN
Qty: 15 TABLET | Refills: 0 | Status: SHIPPED | OUTPATIENT
Start: 2025-07-01

## 2025-07-01 RX ADMIN — DOCUSATE SODIUM AND SENNOSIDES 2 TABLET: 8.6; 5 TABLET, FILM COATED ORAL at 08:34

## 2025-07-01 RX ADMIN — VARICELLA VIRUS VACCINE LIVE 0.5 ML: 1350 INJECTION, POWDER, LYOPHILIZED, FOR SUSPENSION SUBCUTANEOUS at 09:54

## 2025-07-01 RX ADMIN — CYCLOBENZAPRINE HYDROCHLORIDE 10 MG: 5 TABLET, FILM COATED ORAL at 09:54

## 2025-07-01 RX ADMIN — IBUPROFEN 800 MG: 800 TABLET, FILM COATED ORAL at 06:48

## 2025-07-01 RX ADMIN — HEPATITIS B VACCINE (RECOMBINANT) 10 MCG: 10 INJECTION, SUSPENSION INTRAMUSCULAR; SUBCUTANEOUS at 09:54

## 2025-07-01 RX ADMIN — POLYETHYLENE GLYCOL 3350 17 G: 17 POWDER, FOR SOLUTION ORAL at 08:34

## 2025-07-01 RX ADMIN — IBUPROFEN 800 MG: 800 TABLET, FILM COATED ORAL at 00:46

## 2025-07-01 RX ADMIN — ACETAMINOPHEN 975 MG: 325 TABLET ORAL at 00:45

## 2025-07-01 RX ADMIN — DOCUSATE SODIUM AND SENNOSIDES 2 TABLET: 8.6; 5 TABLET, FILM COATED ORAL at 00:53

## 2025-07-01 RX ADMIN — LIDOCAINE 2 PATCH: 4 PATCH TOPICAL at 08:34

## 2025-07-01 RX ADMIN — CYCLOBENZAPRINE HYDROCHLORIDE 10 MG: 5 TABLET, FILM COATED ORAL at 02:03

## 2025-07-01 RX ADMIN — ACETAMINOPHEN 975 MG: 325 TABLET ORAL at 06:47

## 2025-07-01 ASSESSMENT — ACTIVITIES OF DAILY LIVING (ADL)
ADLS_ACUITY_SCORE: 22

## 2025-07-01 NOTE — PLAN OF CARE
Goal Outcome Evaluation:      Plan of Care Reviewed With: patient      Vital signs stable. Postpartum assessment WDL. Pain controlled with Tylenol, Ibuprofen, and Flexeril. Daughter at bedside helping with baby cares. Patient ambulating independently. Patient reports passing gas, but has not had a BM yet. Formula feeding baby and breastfeeding intermittently. Patient and infant bonding well. Will continue with current plan of care.       Problem: Adult Inpatient Plan of Care  Goal: Optimal Comfort and Wellbeing  Outcome: Progressing  Intervention: Monitor Pain and Promote Comfort  Recent Flowsheet Documentation  Taken 2025 by Karla Pritchett RN  Pain Management Interventions: medication (see MAR)  Intervention: Provide Person-Centered Care  Recent Flowsheet Documentation  Taken 2025 by Karla Pritchett RN  Trust Relationship/Rapport:   care explained   choices provided   questions answered   questions encouraged   reassurance provided     Problem: Postpartum ( Delivery)  Goal: Effective Bowel Elimination  Outcome: Progressing  Intervention: Enhance Bowel Motility and Elimination  Recent Flowsheet Documentation  Taken 2025 by Karla Pritchett, RN  Bowel Elimination Promotion: adequate fluid intake promoted

## 2025-07-01 NOTE — PLAN OF CARE
"Goal Outcome Evaluation:  Shift 0700-discharge  Afebrile. VSS, except 6/10. Fundus U1, scant, rubra lochia. LS clear on RA. Good PO intake, good appetite. Denies PreE Symptoms. Incision site is open to air with steri strips in place. Patient is using belly band and tolerating well. Voiding independently, passing gas. Taking IBU, tylenol and flexeril as needed. Bonding well with infant in room. Helping with infant cares. Patient is breast feeding and formula feeding every 2-4 hours. Patient discharged. All education reviewed, questions addressed, medication reviewed and verified. Discharge weight completed. EDS 5, BC completed, Videos reviewed. ROP completed to take home. Has a Pump at home. Received Hep B and varicella boosters. Discharged at 1015, but waited on the unit for discharge medications till 1403 (as narc key was also missing). Hourly monitoring completed.     Problem: Adult Inpatient Plan of Care  Goal: Plan of Care Review  Description: The Plan of Care Review/Shift note should be completed every shift.  The Outcome Evaluation is a brief statement about your assessment that the patient is improving, declining, or no change.  This information will be displayed automatically on your shift  note.  Outcome: Met  Goal: Patient-Specific Goal (Individualized)  Description: You can add care plan individualizations to a care plan. Examples of Individualization might be:  \"Parent requests to be called daily at 9am for status\", \"I have a hard time hearing out of my right ear\", or \"Do not touch me to wake me up as it startles  me\".  Outcome: Met  Goal: Absence of Hospital-Acquired Illness or Injury  Outcome: Met  Intervention: Prevent Skin Injury  Recent Flowsheet Documentation  Taken 7/1/2025 0829 by Samaria Costello RN  Body Position: position changed independently  Intervention: Prevent and Manage VTE (Venous Thromboembolism) Risk  Recent Flowsheet Documentation  Taken 7/1/2025 0829 by Samaria Costello RN  VTE " Prevention/Management: (fluids and ambulation promoted) other (see comments)  Intervention: Prevent Infection  Recent Flowsheet Documentation  Taken 2025 by Samaria Costello RN  Infection Prevention:   cohorting utilized   hand hygiene promoted   personal protective equipment utilized   rest/sleep promoted  Goal: Optimal Comfort and Wellbeing  Outcome: Met  Intervention: Monitor Pain and Promote Comfort  Recent Flowsheet Documentation  Taken 2025 by Samaria Costello RN  Pain Management Interventions: medication (see MAR)  Intervention: Provide Person-Centered Care  Recent Flowsheet Documentation  Taken 2025 by Samaria Costello RN  Trust Relationship/Rapport:   care explained   choices provided   emotional support provided   empathic listening provided   questions answered   questions encouraged   thoughts/feelings acknowledged   reassurance provided  Goal: Readiness for Transition of Care  Outcome: Met     Problem:  Delivery  Goal: Stable Fetal Wellbeing  Intervention: Promote and Monitor Fetal Wellbeing  Recent Flowsheet Documentation  Taken 2025 by Samaria Costello RN  Body Position: position changed independently  Goal: Absence of Infection Signs and Symptoms  Intervention: Prevent or Manage Infection  Recent Flowsheet Documentation  Taken 2025 by Samaria Costello RN  Infection Prevention:   cohorting utilized   hand hygiene promoted   personal protective equipment utilized   rest/sleep promoted  Infection Management: aseptic technique maintained      Cartilage Graft Text: The defect edges were debeveled with a #15 scalpel blade.  Given the location of the defect, shape of the defect, the fact the defect involved a full thickness cartilage defect a cartilage graft was deemed most appropriate.  An appropriate donor site was identified, cleansed, and anesthetized. The cartilage graft was then harvested and transferred to the recipient site, oriented appropriately and then sutured into place.  The secondary defect was then repaired using a primary closure.

## 2025-07-01 NOTE — DISCHARGE INSTRUCTIONS
Warning Signs after Having a Baby    Keep this paper on your fridge or somewhere else where you can see it.    Call your provider if you have any of these symptoms up to 12 weeks after having your baby.    Thoughts of hurting yourself or your baby  Pain in your chest or trouble breathing  Severe headache not helped by pain medicine  Eyesight concerns (blurry vision, seeing spots or flashes of light, other changes to eyesight)  Fainting, shaking or other signs of a seizure    Call 9-1-1 if you feel that it is an emergency.     The symptoms below can happen to anyone after giving birth. They can be very serious. Call your provider if you have any of these warning signs.    My provider s phone number: _______________________    Losing too much blood (hemorrhage)    Call your provider if you soak through a pad in less than an hour or pass blood clots bigger than a golf ball. These may be signs that you are bleeding too much.    Blood clots in the legs or lungs    After you give birth, your body naturally clots its blood to help prevent blood loss. Sometimes this increased clotting can happen in other areas of the body, like the legs or lungs. This can block your blood flow and be very dangerous.     Call your provider if you:  Have a red, swollen spot on the back of your leg that is warm or painful when you touch it.   Are coughing up blood.     Infection    Call your provider if you have any of these symptoms:  Fever of 100.4 F (38 C) or higher.  Pain or redness around your stitches if you had an incision.   Any yellow, white, or green fluid coming from places where you had stitches or surgery.    Mood Problems (postpartum depression)    Many people feel sad or have mood changes after having a baby. But for some people, these mood swings are worse.     Call your provider right away if you feel so anxious or nervous that you can't care for yourself or your baby.    Preeclampsia (high blood pressure)    Even if you  didn't have high blood pressure when you were pregnant, you are at risk for the high blood pressure disease called preeclampsia. This risk can last up to 12 weeks after giving birth.     Call your provider if you have:   Pain on your right side under your rib cage  Sudden swelling in the hands and face    Remember: You know your body. If something doesn't feel right, get medical help.     For informational purposes only. Not to replace the advice of your health care provider. Copyright 2020 [place] [facility]. All rights reserved. Clinically reviewed by Mareclla Ansari, RNC-OB, MSN. Live[employer] [id number] - Rev .  Parto por cesárea: Qué esperar en el [facility]   Section: What to Expect at Home  [place] recuperación     Un parto por cesárea, o simplemente cesárea, es stephen cirugía mediante la cual se da a keaton a un bebé a través de un ave, llamado incisión, que hace el médico en la parte baja del abdomen y el útero.  Es posible que sienta dolor en la parte baja del abdomen y que necesite analgésicos (medicamentos para el dolor) ginger 1 o 2 semanas. Puede esperar tener algo de sangrado vaginal ginger varias semanas. Es probable que necesite unas 6 semanas para recuperarse completamente.  Es importante que se tome las cosas con calma mientras jelena la incisión. Evite levantar objetos pesados, hacer actividades vigorosas o hacer ejercicios que pudieran esforzar los músculos abdominales mientras se recupera. Pídale a un familiar o amigo que la ayude con las tareas domésticas, la comida y las compras.  Esta hoja de cuidados le da stephen idea general del tiempo que le llevará recuperarse. Sin embargo, cada persona se recupera a un ritmo diferente. Siga los pasos que se mencionan a continuación para recuperarse lo más rápido posible.   Cómo puede cuidarse en el hogar?  Actividad       Descanse cuando se sienta cansada. Dormir lo suficiente la ayudará a recuperarse.        Intente caminar todos los días. Comience  caminando un poco más de lo que caminó el día anterior. Poco a poco, aumente la distancia. Caminar mejora el flujo de narda y ayuda a prevenir la neumonía, el estreñimiento y los coágulos de narda.        Evite las actividades intensas, bandar montar en bicicleta, trotar, levantar pesas y hacer ejercicios aeróbicos ginger 6 semanas o hasta que chavez médico lo apruebe.        Hasta que chavez médico lo apruebe, no levante nada más pesado que chavez bebé.        No alex abdominales ni ningún otro ejercicio que utilice los músculos del abdomen ginger 6 semanas o hasta que chavez médico lo apruebe.        Sostenga stephen almohada sobre la incisión al toser o respirar profundamente. Le proporcionará apoyo a chavez abdomen y reducirá el dolor.        Puede ducharse bandar de costumbre. Seque la incisión con toques suaves de toalla cuando termine.        Tendrá algo de sangrado vaginal. Use toallas sanitarias. No se alex lavados vaginales ni use tampones hasta que el médico se lo permita.        Pregúntele a chavez médico cuándo puede volver a conducir.        Es probable que necesite ausentarse del trabajo por lo menos 6 semanas. [place] dependerá del tipo de trabajo que alex y de cómo se sienta.        Pregúntele a chavez médico cuándo puede tener relaciones sexuales.   Alimentación       Puede continuar con chavez dieta normal. Si tiene malestar estomacal, coma alimentos suaves bajos en grasa, bandar arroz sin condimentar, yuridia a la sohan, pan trina y yogur.        Destini abundantes líquidos (a menos que chavez médico le indique lo contrario).        Podría notar que no evacua el intestino con regularidad manav después de la cirugía. [place] es común. Trate de evitar el estreñimiento y no hacer esfuerzos cuando evacua el intestino. Ever vez desee natalia un suplemento de fibra todos los días. Si no ha evacuado el intestino después de un par de días, pregúntele a chavez médico si puede natalia un laxante suave.        Si está amamantando, limite el alcohol. El alcohol  puede causar falta de energía y otros problemas de alex para el bebé cuando stephen dottie que amamanta geoff mucho. También puede ser un obstáculo en la capacidad de stephen mamá para alimentar a hcavez bebé o para cuidarlo en otros aspectos. No hay mucha investigación sobre qué cantidad exacta de alcohol puede ser perjudicial para un bebé. No consumir alcohol es la opción más andres para chavez bebé. Si opta por beber stephen bebida alcohólica de vez en cuando, solo tome stephen bebida y limite las ocasiones en que baudilio alcohol. Después de beber alcohol, espere al menos 2 horas antes de amamantar para reducir la cantidad de alcohol que el bebé pueda recibir a través de la leche.   Medicamentos       Chavez médico le dirá si puede volver a natalia peter medicamentos y cuándo puede volver a hacerlo. También le dará indicaciones sobre cualquier medicamento nuevo que deba natalia usted.        Si dejó de natalia aspirina o algún otro anticoagulante, chavez médico le dirá cuándo puede comenzar a tomarlo nuevamente.        [place] los analgésicos (medicamentos para el dolor) exactamente bandar le fueron indicados.  Si el médico le recetó un analgésico, tómelo según las indicaciones.  Si no está tomando un analgésico recetado, pregúntele a chavez médico si puede natalia geovanna de venta john.        Si le parece que el analgésico le está produciendo malestar estomacal:  [place] el medicamento después de las comidas (a menos que chavez médico le haya indicado lo contrario).  Pídale al médico un analgésico diferente.        Si chavez médico le recetó antibióticos, tómelos según las indicaciones. No deje de tomarlos por el hecho de sentirse mejor. Debe natalia todos los antibióticos hasta terminarlos.   Cuidado de la incisión       Si tiene tiras de cinta adhesiva sobre la incisión, déjeselas puestas ginger stephen semana o hasta que se caigan por sí solas.        Lave la ana a diario con agua jabonosa tibia y séquela con toques suaves de toalla. No use peróxido de hidrógeno (agua oxigenada)  "o alcohol porque pueden retrasar la sanación. Podría cubrir la ana con stephen venda de gasa si supura o roza contra la ropa. Cambie la venda todos los juanjose.        Mantenga la ana limpia y seca.   Otras instrucciones       Si amamanta a chavez bebé, betite vez le resulte más cómodo, ginger el proceso de sanación, sostener al bebé de alguna manera en la que no se apoye en chavez abdomen. Intente poner a chavez bebé debajo del brazo, con el cuerpo del lado que lo vaya a amamantar. Sostenga la parte superior del cuerpo de chavez bebé con chavez brazo. Con beatriz mano usted puede controlar la kath de chavez bebé para acercarle la boca a chavez seno. [place] se conoce a veces bandar \"posición de balón de fútbol americano\".   La atención de seguimiento es stephen parte clave de chavez tratamiento y seguridad. Asegúrese de hacer y acudir a todas las citas, y llame a chavez médico si está teniendo problemas. También es stephen buena idea saber los resultados de peter exámenes y mantener stephen lista de los medicamentos que franci.   Cuándo debe pedir ayuda?  Comparta esta información con chavez jayden, chavez oracio o stephen amiga. Pueden ayudarla a prestar atención a las señales de advertencia.  Llame al 911  en cualquier momento que considere que necesita atención de urgencia. Por ejemplo, llame si:       Tiene pensamientos de herirse a sí misma, hacerle daño a chavez bebé o a otra persona.        Se desmayó (perdió el conocimiento).        Tiene dolor en el pecho, le falta el aire o tose narda.        Tiene convulsiones.   Dónde obtener ayuda las 24 horas del día, los 7 días de la semana   Si usted o alguien que conoce habla de suicidio, autolesionarse, stephen crisis de alex mental, stephen crisis por consumo de sustancias o cualquier otro tipo de malestar psíquico, obtenga ayuda de inmediato. Usted puede:       Marcar 988 para llamar a la línea de prevención del suicidio y crisis.        Llamar al [phone] ([phone]).        Enviar un mensaje de texto que diga HOME al [id number] para " acceder a la línea de mensajes de texto en casos de crisis.   Considere guardar estos números en chavez teléfono.  Visite [employer] para obtener más información o conversar en línea.  Llame al médico ahora mismo o busque atención médica inmediata si:       Tiene puntos de sutura flojos o se le abre la incisión.        Tiene señales de hemorragia (sangrado excesivo), bandar:  Sangrado vaginal intenso. DISH significa que está empapando stephen o más toallas sanitarias en stephen hora. O expulsa coágulos de narda más grandes que un huevo.  Se siente mareada o aturdida, o siente bandar si se fuera a desmayar.  Se siente tan cansada o débil que no puede hacer peter actividades habituales.  Latidos cardíacos acelerados o irregulares.  Dolor abdominal nuevo o más intenso.        Tiene síntomas de infección, tales bandar:  Aumento del dolor, la hinchazón, la temperatura o el enrojecimiento.  Vetas rojizas que salen de la incisión.  Pus que sale de la incisión.  Fiebre.  Micción frecuente o dolorosa o narda en la orina.  Secreción vaginal con olor desagradable.  Dolor abdominal nuevo o más intenso.        Tiene síntomas de un coágulo de narda en la pierna (llamado trombosis venosa profunda), tales bandar:  Dolor en la pantorrilla, la parte posterior de la rodilla, el muslo o la panda.  Hinchazón en la pierna o la panda.  Un cambio de color en la pierna o la panda. La piel podría estar enrojecida o morada, según cuál sea chavez color de piel normal.        Tiene señales de preeclampsia, tales bandar:  Hinchazón repentina de la constantino, las trung o los pies.  Nuevos problemas de visión (bandar oscurecimiento, sebastien borroso o sebastien puntos).  Dolor de kath intenso.        Tiene señales de insuficiencia cardíaca, tales bandar:  Nueva o mayor falta de aire.  Nueva o mayor hinchazón en las piernas, los tobillos o los pies.  Aumento repentino de peso, bandra más de 2 o 3 libras (0.9 kg o 1.4 kg) en un día o 5 libras (2.3 kg) en stephen semana.  Se siente tan cansada  "o débil que no puede hacer peter actividades habituales.        Se sometió a un alivio del dolor raquídeo o epidural y tiene:  Dolor de espalda nuevo o peor.  Aumento del dolor, la hinchazón, la temperatura o el enrojecimiento en el lugar de la inyección.  Hormigueo, debilidad o entumecimiento en las piernas o la panda.   Preste especial atención a los cambios en chavez alex y asegúrese de comunicarse con chavez médico si:       El sangrado vaginal no disminuye.        Siente tristeza, ansiedad o desesperanza ginger más de algunos días.        Está teniendo problemas con los senos o la lactancia.    Dónde puede encontrar más información en inglés?  Vaya a [email or web address].[email or web address]  Escriba M806 en la búsqueda para aprender más acerca de \"Parto por cesárea: Qué esperar en el hogar.\"  Revisado: 30 amiil, 2024  Versión del contenido: 14.5    [id number] [employer]dex.   Las instrucciones de cuidado fueron adaptadas bajo licencia por chavez profesional de atención médica. Si usted tiene preguntas sobre stephen afección médica o sobre estas instrucciones, siempre pregunte a chavez profesional de alex. [employer]dex niega toda garantía o responsabilidad por chavez uso de esta información.      "
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
## 2025-07-02 ENCOUNTER — MEDICAL CORRESPONDENCE (OUTPATIENT)
Dept: HEALTH INFORMATION MANAGEMENT | Facility: CLINIC | Age: 45
End: 2025-07-02

## 2025-07-02 ENCOUNTER — APPOINTMENT (OUTPATIENT)
Dept: INTERPRETER SERVICES | Facility: CLINIC | Age: 45
End: 2025-07-02
Payer: COMMERCIAL

## 2025-07-02 ENCOUNTER — PATIENT OUTREACH (OUTPATIENT)
Dept: CARE COORDINATION | Facility: CLINIC | Age: 45
End: 2025-07-02
Payer: COMMERCIAL
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
## 2025-08-29 ENCOUNTER — MEDICAL CORRESPONDENCE (OUTPATIENT)
Dept: HEALTH INFORMATION MANAGEMENT | Facility: CLINIC | Age: 45
End: 2025-08-29
Payer: COMMERCIAL

## 2025-08-29 ENCOUNTER — TRANSFERRED RECORDS (OUTPATIENT)
Dept: HEALTH INFORMATION MANAGEMENT | Facility: CLINIC | Age: 45
End: 2025-08-29
Payer: COMMERCIAL

## 2025-08-29 LAB — PHQ9 SCORE: 0

## 2025-08-31 ENCOUNTER — TRANSCRIBE ORDERS (OUTPATIENT)
Dept: OTHER | Age: 45
End: 2025-08-31

## 2025-08-31 DIAGNOSIS — Z30.09 CONSULTATION FOR FEMALE STERILIZATION: Primary | ICD-10-CM

## 2025-09-01 ENCOUNTER — PATIENT OUTREACH (OUTPATIENT)
Dept: CARE COORDINATION | Facility: CLINIC | Age: 45
End: 2025-09-01
Payer: COMMERCIAL

## 2025-09-03 ENCOUNTER — PATIENT OUTREACH (OUTPATIENT)
Dept: CARE COORDINATION | Facility: CLINIC | Age: 45
End: 2025-09-03
Payer: COMMERCIAL

## (undated) DEVICE — GOWN XLG DISP 9545

## (undated) DEVICE — GLOVE ESTEEM POWDER FREE SMT 7.0  2D72PT70

## (undated) DEVICE — SOL NACL 0.9% IRRIG 1000ML BOTTLE 07138-09

## (undated) DEVICE — WIRE GUIDE BENSON STR .035X50CM G00661

## (undated) DEVICE — DRSG PRIMAPORE 02X3" 7133

## (undated) DEVICE — SU VICRYL 3-0 CTX 36" UND J980H

## (undated) DEVICE — Device

## (undated) DEVICE — STOCKING SLEEVE COMPRESSION CALF LG

## (undated) DEVICE — CATH TRAY FOLEY 16FR BARDEX W/DRAIN BAG STATLOCK 300316A

## (undated) DEVICE — PACK C-SECTION LF PL15OTA83B

## (undated) DEVICE — GLOVE PROTEXIS POWDER FREE SMT 7.5  2D72PT75X

## (undated) DEVICE — DECANTER BAG 2002S

## (undated) DEVICE — NDL BIOPSY TEMNO 18GAX15CM ACT1815

## (undated) DEVICE — SU MONOCRYL 0 CTB-1 36" YB946

## (undated) DEVICE — COVER ULTRASOUND PROBE W/GEL FLEXI-FEEL 6"X58" LF  25-FF658

## (undated) DEVICE — LINEN GOWN XLG 5407

## (undated) DEVICE — NDL 18GAX1.5" 305185

## (undated) DEVICE — SOL NACL 0.9% INJ 250ML BAG 2B1322Q

## (undated) DEVICE — SOL WATER IRRIG 1000ML BOTTLE 07139-09

## (undated) DEVICE — SPECIMEN CONTAINER W/10% BUFFERED FORMALIN 120ML 591201

## (undated) DEVICE — SU MONOCRYL 4-0 PS-2 18" UND Y496G

## (undated) DEVICE — GELFOAM 7X12MM

## (undated) DEVICE — LINEN TOWEL PACK X5 5464

## (undated) DEVICE — PREP CHLORAPREP 26ML TINTED ORANGE  260815

## (undated) DEVICE — ESU PENCIL W/SMOKE EVAC NEPTUNE STRYKER 0703-046-000

## (undated) DEVICE — GLOVE PROTEXIS BLUE W/NEU-THERA 7.5  2D73EB75

## (undated) DEVICE — BLADE KNIFE SURG 11 371111

## (undated) DEVICE — STRAP KNEE/BODY 31143004

## (undated) DEVICE — CATH IV 16X1-1/4 PROTECTIV 326210

## (undated) DEVICE — SU VICRYL 0 CT-1 36" J346H

## (undated) RX ORDER — FENTANYL CITRATE-0.9 % NACL/PF 10 MCG/ML
PLASTIC BAG, INJECTION (ML) INTRAVENOUS
Status: DISPENSED
Start: 2025-06-29

## (undated) RX ORDER — MORPHINE SULFATE 1 MG/ML
INJECTION, SOLUTION EPIDURAL; INTRATHECAL; INTRAVENOUS
Status: DISPENSED
Start: 2025-06-28

## (undated) RX ORDER — ACETAMINOPHEN 325 MG/1
TABLET ORAL
Status: DISPENSED
Start: 2022-09-07

## (undated) RX ORDER — FENTANYL CITRATE 50 UG/ML
INJECTION, SOLUTION INTRAMUSCULAR; INTRAVENOUS
Status: DISPENSED
Start: 2025-06-28

## (undated) RX ORDER — KETOROLAC TROMETHAMINE 30 MG/ML
INJECTION, SOLUTION INTRAMUSCULAR; INTRAVENOUS
Status: DISPENSED
Start: 2025-06-29

## (undated) RX ORDER — OXYTOCIN/0.9 % SODIUM CHLORIDE 30/500 ML
PLASTIC BAG, INJECTION (ML) INTRAVENOUS
Status: DISPENSED
Start: 2025-06-29

## (undated) RX ORDER — ONDANSETRON 2 MG/ML
INJECTION INTRAMUSCULAR; INTRAVENOUS
Status: DISPENSED
Start: 2025-06-29

## (undated) RX ORDER — PHENYLEPHRINE HCL IN 0.9% NACL 50MG/250ML
PLASTIC BAG, INJECTION (ML) INTRAVENOUS
Status: DISPENSED
Start: 2025-06-29

## (undated) RX ORDER — BUPIVACAINE HYDROCHLORIDE 2.5 MG/ML
INJECTION, SOLUTION EPIDURAL; INFILTRATION; INTRACAUDAL; PERINEURAL
Status: DISPENSED
Start: 2025-06-29